# Patient Record
Sex: MALE | Race: WHITE | NOT HISPANIC OR LATINO | Employment: FULL TIME | URBAN - NONMETROPOLITAN AREA
[De-identification: names, ages, dates, MRNs, and addresses within clinical notes are randomized per-mention and may not be internally consistent; named-entity substitution may affect disease eponyms.]

---

## 2023-05-24 ENCOUNTER — OFFICE VISIT (OUTPATIENT)
Dept: PRIMARY CARE | Facility: CLINIC | Age: 23
End: 2023-05-24
Payer: COMMERCIAL

## 2023-05-24 VITALS
BODY MASS INDEX: 21.67 KG/M2 | OXYGEN SATURATION: 97 % | TEMPERATURE: 97.9 F | HEART RATE: 96 BPM | WEIGHT: 151 LBS | DIASTOLIC BLOOD PRESSURE: 70 MMHG | SYSTOLIC BLOOD PRESSURE: 108 MMHG

## 2023-05-24 DIAGNOSIS — K52.9 GASTROENTERITIS: Primary | ICD-10-CM

## 2023-05-24 PROCEDURE — 99214 OFFICE O/P EST MOD 30 MIN: CPT | Performed by: FAMILY MEDICINE

## 2023-05-24 PROCEDURE — 1036F TOBACCO NON-USER: CPT | Performed by: FAMILY MEDICINE

## 2023-05-24 RX ORDER — ONDANSETRON 4 MG/1
TABLET, FILM COATED ORAL
COMMUNITY
End: 2024-01-24 | Stop reason: WASHOUT

## 2023-05-24 RX ORDER — ACETAMINOPHEN 500 MG
TABLET ORAL EVERY 6 HOURS PRN
COMMUNITY

## 2023-05-24 RX ORDER — IBUPROFEN 200 MG
TABLET ORAL EVERY 6 HOURS PRN
COMMUNITY

## 2023-05-24 RX ORDER — HYDROGEN PEROXIDE 3 %
20 SOLUTION, NON-ORAL MISCELLANEOUS 2 TIMES DAILY
COMMUNITY
Start: 2023-03-22 | End: 2023-05-24 | Stop reason: SDUPTHER

## 2023-05-24 RX ORDER — HYDROGEN PEROXIDE 3 %
40 SOLUTION, NON-ORAL MISCELLANEOUS 2 TIMES DAILY
Qty: 90 CAPSULE | Refills: 3 | Status: SHIPPED | OUTPATIENT
Start: 2023-05-24 | End: 2023-05-26

## 2023-05-24 ASSESSMENT — ENCOUNTER SYMPTOMS
VOMITING: 0
SINUS PRESSURE: 0
MYALGIAS: 0
COUGH: 0
ABDOMINAL PAIN: 1
RHINORRHEA: 0
PALPITATIONS: 0
HEADACHES: 0
SHORTNESS OF BREATH: 0
DIZZINESS: 0
ACTIVITY CHANGE: 0
NAUSEA: 1
LIGHT-HEADEDNESS: 0
EYE ITCHING: 0
UNEXPECTED WEIGHT CHANGE: 1
DIARRHEA: 1
JOINT SWELLING: 0
DYSURIA: 0
BLOOD IN STOOL: 1
EYE DISCHARGE: 0
NERVOUS/ANXIOUS: 0
WEAKNESS: 0
WHEEZING: 0
CONSTIPATION: 1
SORE THROAT: 0
HEMATURIA: 0
ARTHRALGIAS: 0
FLANK PAIN: 0
FEVER: 0
SLEEP DISTURBANCE: 0
DYSPHORIC MOOD: 0
NUMBNESS: 0
APPETITE CHANGE: 0

## 2023-05-24 NOTE — PROGRESS NOTES
Subjective   Patient ID: Bruno Anders is a 22 y.o. male who presents for GI Problem (CRAMPING, PAIN, GASSY, UNCONTROLLABLE BOWELS-NORMAL TEXTURE).    GI Problem  The primary symptoms include abdominal pain, nausea and diarrhea. Primary symptoms do not include fever, vomiting, dysuria, myalgias, arthralgias or rash.   The illness is also significant for constipation.    HISTORY OF PUD CAN TELL WHEN OFF THE MEDICATION     Review of Systems   Constitutional:  Positive for unexpected weight change. Negative for activity change, appetite change and fever.   HENT:  Negative for congestion, ear pain, postnasal drip, rhinorrhea, sinus pressure and sore throat.    Eyes:  Negative for discharge, itching and visual disturbance.   Respiratory:  Negative for cough, shortness of breath and wheezing.    Cardiovascular:  Negative for chest pain, palpitations and leg swelling.   Gastrointestinal:  Positive for abdominal pain, blood in stool, constipation, diarrhea and nausea. Negative for vomiting.        WIPING  MAY HAVE BLOOD    Endocrine: Negative for cold intolerance, heat intolerance and polyuria.   Genitourinary:  Negative for dysuria, flank pain and hematuria.   Musculoskeletal:  Negative for arthralgias, gait problem, joint swelling and myalgias.   Skin:  Negative for rash.   Allergic/Immunologic: Negative for environmental allergies and food allergies.   Neurological:  Negative for dizziness, syncope, weakness, light-headedness, numbness and headaches.   Psychiatric/Behavioral:  Negative for dysphoric mood and sleep disturbance. The patient is not nervous/anxious.        Objective   /70   Pulse 96   Temp 36.6 °C (97.9 °F)   Wt 68.5 kg (151 lb)   SpO2 97%   BMI 21.67 kg/m²     Physical Exam  Vitals and nursing note reviewed.   Constitutional:       Appearance: Normal appearance.   HENT:      Head: Normocephalic.      Mouth/Throat:      Mouth: Mucous membranes are moist.   Cardiovascular:      Rate and Rhythm:  Normal rate and regular rhythm.      Pulses: Normal pulses.      Heart sounds: Normal heart sounds. No murmur heard.     No friction rub. No gallop.   Pulmonary:      Effort: Pulmonary effort is normal. No respiratory distress.      Breath sounds: Normal breath sounds. No wheezing.   Abdominal:      General: Abdomen is flat. Bowel sounds are normal. There is no distension.      Palpations: Abdomen is soft. There is no mass.      Tenderness: There is abdominal tenderness.      Comments: TENDER LUQ OF THE ABDOMEN  OLDER CT SHOWED SMALL BOWEL ISSUES    Musculoskeletal:         General: No deformity. Normal range of motion.   Skin:     General: Skin is warm and dry.      Capillary Refill: Capillary refill takes less than 2 seconds.   Neurological:      General: No focal deficit present.      Mental Status: He is alert and oriented to person, place, and time.   Psychiatric:         Mood and Affect: Mood normal.         Assessment/Plan   Diagnoses and all orders for this visit:  Gastroenteritis  -     Referral to Gastroenterology; Future  -     esomeprazole (NexIUM) 20 mg DR capsule; Take 2 capsules (40 mg) by mouth 2 times a day.

## 2023-05-26 ENCOUNTER — TELEPHONE (OUTPATIENT)
Dept: PRIMARY CARE | Facility: CLINIC | Age: 23
End: 2023-05-26
Payer: COMMERCIAL

## 2023-05-26 DIAGNOSIS — K52.9 GASTROENTERITIS: ICD-10-CM

## 2023-05-26 RX ORDER — ESOMEPRAZOLE MAGNESIUM 40 MG/1
40 CAPSULE, DELAYED RELEASE ORAL
Qty: 90 CAPSULE | Refills: 3 | Status: SHIPPED | OUTPATIENT
Start: 2023-05-26 | End: 2024-05-25

## 2023-05-26 NOTE — TELEPHONE ENCOUNTER
Bruno called and left a vm stating the nexium was supposed to be sent over as a 40 mg tablet once daily but was sent over as 20 mg two tablets twice daily- this will cost him 200.00 out of pocket

## 2023-11-10 ENCOUNTER — APPOINTMENT (OUTPATIENT)
Dept: RADIOLOGY | Facility: HOSPITAL | Age: 23
End: 2023-11-10
Payer: COMMERCIAL

## 2023-11-17 ENCOUNTER — HOSPITAL ENCOUNTER (OUTPATIENT)
Dept: RADIOLOGY | Facility: HOSPITAL | Age: 23
Discharge: HOME | End: 2023-11-17
Payer: COMMERCIAL

## 2023-11-17 DIAGNOSIS — S33.5XXA SPRAIN OF LIGAMENTS OF LUMBAR SPINE, INITIAL ENCOUNTER: ICD-10-CM

## 2023-11-17 PROCEDURE — 72148 MRI LUMBAR SPINE W/O DYE: CPT

## 2024-01-14 ENCOUNTER — APPOINTMENT (OUTPATIENT)
Dept: RADIOLOGY | Facility: HOSPITAL | Age: 24
End: 2024-01-14
Payer: COMMERCIAL

## 2024-01-14 ENCOUNTER — HOSPITAL ENCOUNTER (EMERGENCY)
Facility: HOSPITAL | Age: 24
Discharge: HOME | End: 2024-01-14
Attending: EMERGENCY MEDICINE
Payer: COMMERCIAL

## 2024-01-14 VITALS
DIASTOLIC BLOOD PRESSURE: 71 MMHG | RESPIRATION RATE: 18 BRPM | SYSTOLIC BLOOD PRESSURE: 127 MMHG | BODY MASS INDEX: 20.99 KG/M2 | HEART RATE: 82 BPM | WEIGHT: 155 LBS | OXYGEN SATURATION: 98 % | HEIGHT: 72 IN | TEMPERATURE: 97.3 F

## 2024-01-14 DIAGNOSIS — K29.00 OTHER ACUTE GASTRITIS, PRESENCE OF BLEEDING UNSPECIFIED: Primary | ICD-10-CM

## 2024-01-14 LAB
ALBUMIN SERPL BCP-MCNC: 4.5 G/DL (ref 3.4–5)
ALP SERPL-CCNC: 96 U/L (ref 33–120)
ALT SERPL W P-5'-P-CCNC: 15 U/L (ref 10–52)
ANION GAP SERPL CALC-SCNC: 11 MMOL/L (ref 10–20)
AST SERPL W P-5'-P-CCNC: 17 U/L (ref 9–39)
BASOPHILS # BLD AUTO: 0.06 X10*3/UL (ref 0–0.1)
BASOPHILS NFR BLD AUTO: 0.8 %
BILIRUB SERPL-MCNC: 0.5 MG/DL (ref 0–1.2)
BUN SERPL-MCNC: 18 MG/DL (ref 6–23)
CALCIUM SERPL-MCNC: 8.7 MG/DL (ref 8.6–10.3)
CHLORIDE SERPL-SCNC: 107 MMOL/L (ref 98–107)
CO2 SERPL-SCNC: 26 MMOL/L (ref 21–32)
CREAT SERPL-MCNC: 0.87 MG/DL (ref 0.5–1.3)
EGFRCR SERPLBLD CKD-EPI 2021: >90 ML/MIN/1.73M*2
EOSINOPHIL # BLD AUTO: 0.12 X10*3/UL (ref 0–0.7)
EOSINOPHIL NFR BLD AUTO: 1.6 %
ERYTHROCYTE [DISTWIDTH] IN BLOOD BY AUTOMATED COUNT: 11.6 % (ref 11.5–14.5)
GLUCOSE SERPL-MCNC: 90 MG/DL (ref 74–99)
HCT VFR BLD AUTO: 44.3 % (ref 41–52)
HGB BLD-MCNC: 15.5 G/DL (ref 13.5–17.5)
HOLD SPECIMEN: NORMAL
IMM GRANULOCYTES # BLD AUTO: 0.02 X10*3/UL (ref 0–0.7)
IMM GRANULOCYTES NFR BLD AUTO: 0.3 % (ref 0–0.9)
LACTATE SERPL-SCNC: 1.1 MMOL/L (ref 0.4–2)
LIPASE SERPL-CCNC: 44 U/L (ref 9–82)
LYMPHOCYTES # BLD AUTO: 2.52 X10*3/UL (ref 1.2–4.8)
LYMPHOCYTES NFR BLD AUTO: 33.9 %
MCH RBC QN AUTO: 30.5 PG (ref 26–34)
MCHC RBC AUTO-ENTMCNC: 35 G/DL (ref 32–36)
MCV RBC AUTO: 87 FL (ref 80–100)
MONOCYTES # BLD AUTO: 0.71 X10*3/UL (ref 0.1–1)
MONOCYTES NFR BLD AUTO: 9.5 %
NEUTROPHILS # BLD AUTO: 4.01 X10*3/UL (ref 1.2–7.7)
NEUTROPHILS NFR BLD AUTO: 53.9 %
NRBC BLD-RTO: 0 /100 WBCS (ref 0–0)
PLATELET # BLD AUTO: 203 X10*3/UL (ref 150–450)
POTASSIUM SERPL-SCNC: 4 MMOL/L (ref 3.5–5.3)
PROT SERPL-MCNC: 6.7 G/DL (ref 6.4–8.2)
RBC # BLD AUTO: 5.08 X10*6/UL (ref 4.5–5.9)
SODIUM SERPL-SCNC: 140 MMOL/L (ref 136–145)
WBC # BLD AUTO: 7.4 X10*3/UL (ref 4.4–11.3)

## 2024-01-14 PROCEDURE — 2500000004 HC RX 250 GENERAL PHARMACY W/ HCPCS (ALT 636 FOR OP/ED)

## 2024-01-14 PROCEDURE — 74177 CT ABD & PELVIS W/CONTRAST: CPT

## 2024-01-14 PROCEDURE — 2500000002 HC RX 250 W HCPCS SELF ADMINISTERED DRUGS (ALT 637 FOR MEDICARE OP, ALT 636 FOR OP/ED)

## 2024-01-14 PROCEDURE — 96361 HYDRATE IV INFUSION ADD-ON: CPT

## 2024-01-14 PROCEDURE — 96375 TX/PRO/DX INJ NEW DRUG ADDON: CPT

## 2024-01-14 PROCEDURE — 2500000001 HC RX 250 WO HCPCS SELF ADMINISTERED DRUGS (ALT 637 FOR MEDICARE OP): Performed by: EMERGENCY MEDICINE

## 2024-01-14 PROCEDURE — 83605 ASSAY OF LACTIC ACID: CPT | Performed by: EMERGENCY MEDICINE

## 2024-01-14 PROCEDURE — 36415 COLL VENOUS BLD VENIPUNCTURE: CPT | Performed by: EMERGENCY MEDICINE

## 2024-01-14 PROCEDURE — 83690 ASSAY OF LIPASE: CPT | Performed by: EMERGENCY MEDICINE

## 2024-01-14 PROCEDURE — 2550000001 HC RX 255 CONTRASTS: Performed by: EMERGENCY MEDICINE

## 2024-01-14 PROCEDURE — 99284 EMERGENCY DEPT VISIT MOD MDM: CPT | Mod: 25 | Performed by: EMERGENCY MEDICINE

## 2024-01-14 PROCEDURE — 74177 CT ABD & PELVIS W/CONTRAST: CPT | Performed by: STUDENT IN AN ORGANIZED HEALTH CARE EDUCATION/TRAINING PROGRAM

## 2024-01-14 PROCEDURE — 2500000004 HC RX 250 GENERAL PHARMACY W/ HCPCS (ALT 636 FOR OP/ED): Performed by: EMERGENCY MEDICINE

## 2024-01-14 PROCEDURE — 80053 COMPREHEN METABOLIC PANEL: CPT | Performed by: EMERGENCY MEDICINE

## 2024-01-14 PROCEDURE — 96374 THER/PROPH/DIAG INJ IV PUSH: CPT

## 2024-01-14 PROCEDURE — 85025 COMPLETE CBC W/AUTO DIFF WBC: CPT | Performed by: EMERGENCY MEDICINE

## 2024-01-14 RX ORDER — AMOXICILLIN 500 MG/1
500 CAPSULE ORAL EVERY 8 HOURS SCHEDULED
Qty: 30 CAPSULE | Refills: 0 | Status: SHIPPED | OUTPATIENT
Start: 2024-01-14 | End: 2024-01-24

## 2024-01-14 RX ORDER — CLARITHROMYCIN 500 MG/1
500 TABLET, FILM COATED ORAL 2 TIMES DAILY
Qty: 20 TABLET | Refills: 0 | Status: SHIPPED | OUTPATIENT
Start: 2024-01-14 | End: 2024-01-24 | Stop reason: WASHOUT

## 2024-01-14 RX ORDER — ONDANSETRON HYDROCHLORIDE 2 MG/ML
INJECTION, SOLUTION INTRAVENOUS
Status: COMPLETED
Start: 2024-01-14 | End: 2024-01-14

## 2024-01-14 RX ORDER — MORPHINE SULFATE 4 MG/ML
INJECTION INTRAVENOUS
Status: COMPLETED
Start: 2024-01-14 | End: 2024-01-14

## 2024-01-14 RX ORDER — AZITHROMYCIN 250 MG/1
500 TABLET, FILM COATED ORAL ONCE
Status: COMPLETED | OUTPATIENT
Start: 2024-01-14 | End: 2024-01-14

## 2024-01-14 RX ORDER — AZITHROMYCIN 250 MG/1
TABLET, FILM COATED ORAL
Status: COMPLETED
Start: 2024-01-14 | End: 2024-01-14

## 2024-01-14 RX ORDER — PHENOL/SODIUM PHENOLATE
20 AEROSOL, SPRAY (ML) MUCOUS MEMBRANE 2 TIMES DAILY
Qty: 20 TABLET | Refills: 0 | Status: SHIPPED | OUTPATIENT
Start: 2024-01-14 | End: 2024-01-24 | Stop reason: ALTCHOICE

## 2024-01-14 RX ORDER — ONDANSETRON HYDROCHLORIDE 2 MG/ML
4 INJECTION, SOLUTION INTRAVENOUS ONCE
Status: COMPLETED | OUTPATIENT
Start: 2024-01-14 | End: 2024-01-14

## 2024-01-14 RX ORDER — MORPHINE SULFATE 4 MG/ML
4 INJECTION INTRAVENOUS ONCE
Status: COMPLETED | OUTPATIENT
Start: 2024-01-14 | End: 2024-01-14

## 2024-01-14 RX ORDER — AMOXICILLIN 250 MG/1
500 CAPSULE ORAL EVERY 8 HOURS SCHEDULED
Status: DISCONTINUED | OUTPATIENT
Start: 2024-01-14 | End: 2024-01-14 | Stop reason: HOSPADM

## 2024-01-14 RX ORDER — CLARITHROMYCIN 250 MG/1
500 TABLET, FILM COATED ORAL EVERY 12 HOURS SCHEDULED
Status: DISCONTINUED | OUTPATIENT
Start: 2024-01-14 | End: 2024-01-14

## 2024-01-14 RX ADMIN — IOHEXOL 75 ML: 350 INJECTION, SOLUTION INTRAVENOUS at 08:59

## 2024-01-14 RX ADMIN — AMOXICILLIN 500 MG: 250 CAPSULE ORAL at 08:49

## 2024-01-14 RX ADMIN — SODIUM CHLORIDE 1000 ML: 9 INJECTION, SOLUTION INTRAVENOUS at 08:03

## 2024-01-14 RX ADMIN — AZITHROMYCIN DIHYDRATE 500 MG: 250 TABLET ORAL at 08:49

## 2024-01-14 RX ADMIN — ONDANSETRON 4 MG: 2 INJECTION INTRAMUSCULAR; INTRAVENOUS at 08:03

## 2024-01-14 RX ADMIN — MORPHINE SULFATE 4 MG: 4 INJECTION INTRAVENOUS at 08:03

## 2024-01-14 RX ADMIN — ONDANSETRON HYDROCHLORIDE 4 MG: 2 INJECTION, SOLUTION INTRAVENOUS at 08:03

## 2024-01-14 RX ADMIN — MORPHINE SULFATE 4 MG: 4 INJECTION, SOLUTION INTRAMUSCULAR; INTRAVENOUS at 08:03

## 2024-01-14 RX ADMIN — AZITHROMYCIN 500 MG: 250 TABLET, FILM COATED ORAL at 08:49

## 2024-01-14 ASSESSMENT — PAIN SCALES - GENERAL
PAINLEVEL_OUTOF10: 10 - WORST POSSIBLE PAIN
PAINLEVEL_OUTOF10: 3

## 2024-01-14 ASSESSMENT — COLUMBIA-SUICIDE SEVERITY RATING SCALE - C-SSRS
6. HAVE YOU EVER DONE ANYTHING, STARTED TO DO ANYTHING, OR PREPARED TO DO ANYTHING TO END YOUR LIFE?: NO
2. HAVE YOU ACTUALLY HAD ANY THOUGHTS OF KILLING YOURSELF?: NO
1. IN THE PAST MONTH, HAVE YOU WISHED YOU WERE DEAD OR WISHED YOU COULD GO TO SLEEP AND NOT WAKE UP?: NO

## 2024-01-14 ASSESSMENT — PAIN DESCRIPTION - LOCATION
LOCATION: ABDOMEN
LOCATION: FACE

## 2024-01-14 ASSESSMENT — PAIN DESCRIPTION - PROGRESSION: CLINICAL_PROGRESSION: GRADUALLY IMPROVING

## 2024-01-14 ASSESSMENT — PAIN DESCRIPTION - PAIN TYPE
TYPE: ACUTE PAIN
TYPE: ACUTE PAIN

## 2024-01-14 ASSESSMENT — PAIN DESCRIPTION - DESCRIPTORS
DESCRIPTORS: STABBING
DESCRIPTORS: STABBING

## 2024-01-14 ASSESSMENT — PAIN DESCRIPTION - ORIENTATION: ORIENTATION: MID

## 2024-01-14 ASSESSMENT — PAIN - FUNCTIONAL ASSESSMENT
PAIN_FUNCTIONAL_ASSESSMENT: 0-10
PAIN_FUNCTIONAL_ASSESSMENT: 0-10

## 2024-01-14 NOTE — ED TRIAGE NOTES
Pt to ED c/o abdominal pain for 3 days. Pt states it is epigastric/mid-abdomen rated 10/10 and described as stabbing. Pt states intermittent nausea and vomiting. Pt with no previous abdominal surgery, only takes omeprazole at home for GERD.

## 2024-01-14 NOTE — ED PROVIDER NOTES
Formerly [facility], later [facility]  Department of Emergency Medicine   ED  Provider Note  1/14/2024  7:39 AM  AC04/AC04              History of Present Illness:   Bruno Anders is a 23 y.o. male presenting to the ED for epigastric abdominal pain, beginning a few months ago.  The complaint has been persistent, moderate in severity, and worsened by nothing.  Remote history of H. pylori gastritis.  He was treated 4 years ago and has been feeling well until a few months ago. He has had increasing abdominal pain with nausea and occasional vomiting.  Reports 1 episode of hematemesis after drinking water earlier this week.  He denies melena or black stool.  He feels this pain is similar to his last episode of gastritis.  He denies any drug or alcohol abuse.  He has no history of IBS or Crohn's disease.      Review of Systems:   Pertinent positives and review of systems as noted above.  Remaining 10 review of systems is negative or noncontributory to today's episode of care.        --------------------------------------------- PAST HISTORY ---------------------------------------------  Past Medical History:  has a past medical history of Chlamydial infection, unspecified (01/07/2019), Contact with and (suspected) exposure to infections with a predominantly sexual mode of transmission (01/04/2019), Fever, unspecified (06/06/2018), Gastritis, unspecified, without bleeding (01/29/2018), Noninfective gastroenteritis and colitis, unspecified (06/04/2021), Other specified health status, Pain in right shoulder (05/24/2016), Personal history of other diseases of the respiratory system (11/18/2016), Personal history of other specified conditions (01/04/2019), Personal history of pneumonia (recurrent) (03/04/2020), Postnasal drip (11/18/2016), and Unspecified abdominal pain (06/04/2021).    Past Surgical History:  has a past surgical history that includes Knee arthroscopy w/ debridement (12/29/2014); Tonsillectomy (12/29/2014); and Other surgical history  (06/06/2018).    Social History:  reports that he has never smoked. He has never used smokeless tobacco. He reports current alcohol use of about 7.0 standard drinks of alcohol per week. He reports that he does not use drugs.    Family History: family history includes Montez's esophagus in his mother; Diverticulosis in his mother; Macular degeneration in his mother; No Known Problems in his father. Unless otherwise noted, family history is non contributory    The patient’s home medications have been reviewed.    Allergies: Pepto-bismol [bismuth subsalicylate]    -------------------------------------------------- RESULTS -------------------------------------------------  All laboratory and radiology results have been personally reviewed by myself   LABS:  Results for orders placed or performed during the hospital encounter of 01/14/24   Light Blue Top   Result Value Ref Range    Extra Tube Hold for add-ons.    PST Top   Result Value Ref Range    Extra Tube Hold for add-ons.    Lavender Top   Result Value Ref Range    Extra Tube Hold for add-ons.    SST TOP   Result Value Ref Range    Extra Tube Hold for add-ons.    Gray Top   Result Value Ref Range    Extra Tube Hold for add-ons.    CBC and Auto Differential   Result Value Ref Range    WBC 7.4 4.4 - 11.3 x10*3/uL    nRBC 0.0 0.0 - 0.0 /100 WBCs    RBC 5.08 4.50 - 5.90 x10*6/uL    Hemoglobin 15.5 13.5 - 17.5 g/dL    Hematocrit 44.3 41.0 - 52.0 %    MCV 87 80 - 100 fL    MCH 30.5 26.0 - 34.0 pg    MCHC 35.0 32.0 - 36.0 g/dL    RDW 11.6 11.5 - 14.5 %    Platelets 203 150 - 450 x10*3/uL    Neutrophils % 53.9 40.0 - 80.0 %    Immature Granulocytes %, Automated 0.3 0.0 - 0.9 %    Lymphocytes % 33.9 13.0 - 44.0 %    Monocytes % 9.5 2.0 - 10.0 %    Eosinophils % 1.6 0.0 - 6.0 %    Basophils % 0.8 0.0 - 2.0 %    Neutrophils Absolute 4.01 1.20 - 7.70 x10*3/uL    Immature Granulocytes Absolute, Automated 0.02 0.00 - 0.70 x10*3/uL    Lymphocytes Absolute 2.52 1.20 - 4.80  x10*3/uL    Monocytes Absolute 0.71 0.10 - 1.00 x10*3/uL    Eosinophils Absolute 0.12 0.00 - 0.70 x10*3/uL    Basophils Absolute 0.06 0.00 - 0.10 x10*3/uL   Comprehensive metabolic panel   Result Value Ref Range    Glucose 90 74 - 99 mg/dL    Sodium 140 136 - 145 mmol/L    Potassium 4.0 3.5 - 5.3 mmol/L    Chloride 107 98 - 107 mmol/L    Bicarbonate 26 21 - 32 mmol/L    Anion Gap 11 10 - 20 mmol/L    Urea Nitrogen 18 6 - 23 mg/dL    Creatinine 0.87 0.50 - 1.30 mg/dL    eGFR >90 >60 mL/min/1.73m*2    Calcium 8.7 8.6 - 10.3 mg/dL    Albumin 4.5 3.4 - 5.0 g/dL    Alkaline Phosphatase 96 33 - 120 U/L    Total Protein 6.7 6.4 - 8.2 g/dL    AST 17 9 - 39 U/L    Bilirubin, Total 0.5 0.0 - 1.2 mg/dL    ALT 15 10 - 52 U/L   Lipase   Result Value Ref Range    Lipase 44 9 - 82 U/L       RADIOLOGY:  Interpreted by Radiologist.  CT abdomen pelvis w IV contrast   Final Result   1. Hepatomegaly and mild-to-moderate diffuse periportal edema.   Findings are nonspecific and differential considerations include   increased fluid status versus viral hepatitis.             Signed by: Vinod Patel 1/14/2024 9:47 AM   Dictation workstation:   [id number]          No results found for this or any previous visit (from the past 4464 hour(s)).  ------------------------- NURSING NOTES AND VITALS REVIEWED ---------------------------   The nursing notes within the ED encounter and vital signs as below have been reviewed.   /74   Pulse 71   Temp 36.3 °C (97.3 °F) (Temporal)   Resp 16   Ht 1.829 m (6')   Wt 70.3 kg (155 lb)   SpO2 100%   BMI 21.02 kg/m²   Oxygen Saturation Interpretation: Normal    Alert male in no acute distress  Lung sounds are clear  Heart tones are normal and regular  Abdomen is soft with epigastric tenderness without guarding mass or rebound  No CVA tenderness noted  Skin without rash  No focal neurological deficit  ------------------------------ ED COURSE/MEDICAL DECISION  MAKING----------------------    Medical Decision Making:   Patient has history of H. pylori with similar symptoms in the past.  He was started on erythromycin amoxicillin and PPI here in the ED. patient CT scan shows no acute pathology.  Patient is feeling better with ED treatment.  He was discharged with prescriptions for Maalox, Protonix, Biaxin, and amoxicillin.  He is stable for outpatient management with GI referral.    Counseling:   The emergency provider has spoken with the patient and discussed today’s results, in addition to providing specific details for the plan of care and counseling regarding the diagnosis and prognosis.  Questions are answered at this time and they are agreeable with the plan.      --------------------------------- IMPRESSION AND DISPOSITION ---------------------------------    IMPRESSION  1. Other acute gastritis, presence of bleeding unspecified        DISPOSITION  Disposition: Discharge to home  Patient condition is fair       Robert Carlson MD  01/14/24 3106

## 2024-01-14 NOTE — DISCHARGE INSTRUCTIONS
Biaxin amoxicillin and Protonix as prescribed.    Maalox 15 mL before meals and at bedtime.        Follow-up with your primary care doctor for GI referral later this week.    Return for worsening symptoms or concerns.

## 2024-01-24 ENCOUNTER — OFFICE VISIT (OUTPATIENT)
Dept: PRIMARY CARE | Facility: CLINIC | Age: 24
End: 2024-01-24
Payer: COMMERCIAL

## 2024-01-24 VITALS
TEMPERATURE: 97.7 F | SYSTOLIC BLOOD PRESSURE: 114 MMHG | OXYGEN SATURATION: 99 % | DIASTOLIC BLOOD PRESSURE: 68 MMHG | HEART RATE: 101 BPM | WEIGHT: 152 LBS | BODY MASS INDEX: 20.61 KG/M2

## 2024-01-24 DIAGNOSIS — K21.9 GASTROESOPHAGEAL REFLUX DISEASE WITHOUT ESOPHAGITIS: ICD-10-CM

## 2024-01-24 DIAGNOSIS — R55 SYNCOPE, UNSPECIFIED SYNCOPE TYPE: ICD-10-CM

## 2024-01-24 DIAGNOSIS — K52.9 GASTROENTERITIS: Primary | ICD-10-CM

## 2024-01-24 PROCEDURE — 99214 OFFICE O/P EST MOD 30 MIN: CPT | Performed by: FAMILY MEDICINE

## 2024-01-24 PROCEDURE — 1036F TOBACCO NON-USER: CPT | Performed by: FAMILY MEDICINE

## 2024-01-24 ASSESSMENT — ENCOUNTER SYMPTOMS
ARTHRALGIAS: 0
ABDOMINAL DISTENTION: 1
DIZZINESS: 0
WEAKNESS: 0
ABDOMINAL PAIN: 1
ACTIVITY CHANGE: 0
SLEEP DISTURBANCE: 0
EYE DISCHARGE: 0
FLANK PAIN: 0
CONSTIPATION: 0
COUGH: 0
NAUSEA: 0
NERVOUS/ANXIOUS: 0
WHEEZING: 0
HEMATURIA: 0
DIARRHEA: 0
SHORTNESS OF BREATH: 0
MYALGIAS: 0
SINUS PRESSURE: 0
UNEXPECTED WEIGHT CHANGE: 0
EYE ITCHING: 0
SORE THROAT: 0
HEADACHES: 0
JOINT SWELLING: 0
DYSPHORIC MOOD: 0
NUMBNESS: 0
RHINORRHEA: 0
VOMITING: 0
BLOOD IN STOOL: 0
FEVER: 0
DYSURIA: 0
PALPITATIONS: 0
LIGHT-HEADEDNESS: 0
APPETITE CHANGE: 0

## 2024-01-24 NOTE — PROGRESS NOTES
Subjective   Patient ID: Bruno Anders is a 23 y.o. male who presents for GI Problem (Increased acid reflux- randomly vomiting, diarrhea- on Omeprazole. Never followed up with GI), Hospital Follow-up ([facility]- heart palpitations.  Low potassium), and sycnope (Monday at work- note to return.).    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   HENT:  Negative for congestion, ear pain, postnasal drip, rhinorrhea, sinus pressure and sore throat.    Eyes:  Negative for discharge, itching and visual disturbance.   Respiratory:  Negative for cough, shortness of breath and wheezing.    Cardiovascular:  Negative for chest pain, palpitations and leg swelling.   Gastrointestinal:  Positive for abdominal distention and abdominal pain. Negative for blood in stool, constipation, diarrhea, nausea and vomiting.   Endocrine: Negative for cold intolerance, heat intolerance and polyuria.   Genitourinary:  Negative for dysuria, flank pain and hematuria.   Musculoskeletal:  Negative for arthralgias, gait problem, joint swelling and myalgias.   Skin:  Negative for rash.   Allergic/Immunologic: Negative for environmental allergies and food allergies.   Neurological:  Positive for syncope. Negative for dizziness, weakness, light-headedness, numbness and headaches.   Psychiatric/Behavioral:  Negative for dysphoric mood and sleep disturbance. The patient is not nervous/anxious.        Objective   /68   Pulse 101   Temp 36.5 °C (97.7 °F)   Wt 68.9 kg (152 lb)   SpO2 99%   BMI 20.61 kg/m²     Physical Exam  Vitals and nursing note reviewed.   Constitutional:       Appearance: Normal appearance.   HENT:      Head: Normocephalic.       Mouth/Throat:      Mouth: Mucous membranes are moist.   Cardiovascular:      Rate and Rhythm: Normal rate and regular rhythm.      Pulses: Normal pulses.      Heart sounds: Normal heart sounds. No murmur heard.     No friction rub. No gallop.   Pulmonary:      Effort: Pulmonary effort is normal. No respiratory distress.      Breath sounds: Normal breath sounds. No wheezing.   Abdominal:      General: Bowel sounds are normal. There is no distension.      Palpations: Abdomen is soft.      Tenderness: There is abdominal tenderness.   Musculoskeletal:         General: No deformity. Normal range of motion.   Skin:     General: Skin is warm and dry.      Capillary Refill: Capillary refill takes less than 2 seconds.   Neurological:      General: No focal deficit present.      Mental Status: He is alert and oriented to person, place, and time.   Psychiatric:         Mood and Affect: Mood normal.         Assessment/Plan   Diagnoses and all orders for this visit:  Gastroenteritis  -     Referral to Gastroenterology; Future  Gastroesophageal reflux disease without esophagitis  -     Referral to Gastroenterology; Future  Syncope, unspecified syncope type  -     Holter or Event Cardiac Monitor; Future

## 2024-01-24 NOTE — LETTER
January 24, 2024     Patient: Bruno Anders   YOB: 2000   Date of Visit: 1/24/2024       To Whom It May Concern:    Bruno Anders was seen in my clinic on 1/24/2024 at 11:00 am. Please excuse Bruno for his absence from work on this day to make the appointment.  Please excuse him from work until 1/28/24.    If you have any questions or concerns, please don't hesitate to call.         Sincerely,         Arlyn Laurent,         CC: No Recipients

## 2024-02-26 ENCOUNTER — HOSPITAL ENCOUNTER (EMERGENCY)
Facility: HOSPITAL | Age: 24
Discharge: HOME | End: 2024-02-26
Attending: EMERGENCY MEDICINE
Payer: COMMERCIAL

## 2024-02-26 ENCOUNTER — APPOINTMENT (OUTPATIENT)
Dept: RADIOLOGY | Facility: HOSPITAL | Age: 24
End: 2024-02-26
Payer: COMMERCIAL

## 2024-02-26 VITALS
WEIGHT: 155 LBS | TEMPERATURE: 98.5 F | RESPIRATION RATE: 16 BRPM | HEART RATE: 71 BPM | HEIGHT: 72 IN | OXYGEN SATURATION: 99 % | SYSTOLIC BLOOD PRESSURE: 118 MMHG | BODY MASS INDEX: 20.99 KG/M2 | DIASTOLIC BLOOD PRESSURE: 65 MMHG

## 2024-02-26 DIAGNOSIS — R10.9 ABDOMINAL PAIN, UNSPECIFIED ABDOMINAL LOCATION: Primary | ICD-10-CM

## 2024-02-26 DIAGNOSIS — K52.9 ENTEROCOLITIS: ICD-10-CM

## 2024-02-26 LAB
ALBUMIN SERPL BCP-MCNC: 5.1 G/DL (ref 3.4–5)
ALP SERPL-CCNC: 84 U/L (ref 33–120)
ALT SERPL W P-5'-P-CCNC: 16 U/L (ref 10–52)
ANION GAP SERPL CALC-SCNC: 14 MMOL/L (ref 10–20)
APPEARANCE UR: CLEAR
AST SERPL W P-5'-P-CCNC: 19 U/L (ref 9–39)
BASOPHILS # BLD AUTO: 0.05 X10*3/UL (ref 0–0.1)
BASOPHILS NFR BLD AUTO: 0.3 %
BILIRUB SERPL-MCNC: 1 MG/DL (ref 0–1.2)
BILIRUB UR STRIP.AUTO-MCNC: NEGATIVE MG/DL
BUN SERPL-MCNC: 14 MG/DL (ref 6–23)
CALCIUM SERPL-MCNC: 9.1 MG/DL (ref 8.6–10.3)
CHLORIDE SERPL-SCNC: 103 MMOL/L (ref 98–107)
CO2 SERPL-SCNC: 27 MMOL/L (ref 21–32)
COLOR UR: YELLOW
CREAT SERPL-MCNC: 0.85 MG/DL (ref 0.5–1.3)
EGFRCR SERPLBLD CKD-EPI 2021: >90 ML/MIN/1.73M*2
EOSINOPHIL # BLD AUTO: 0.01 X10*3/UL (ref 0–0.7)
EOSINOPHIL NFR BLD AUTO: 0.1 %
ERYTHROCYTE [DISTWIDTH] IN BLOOD BY AUTOMATED COUNT: 12 % (ref 11.5–14.5)
FLUAV RNA RESP QL NAA+PROBE: NOT DETECTED
FLUBV RNA RESP QL NAA+PROBE: NOT DETECTED
GLUCOSE SERPL-MCNC: 91 MG/DL (ref 74–99)
GLUCOSE UR STRIP.AUTO-MCNC: NEGATIVE MG/DL
HCT VFR BLD AUTO: 48.2 % (ref 41–52)
HGB BLD-MCNC: 17.2 G/DL (ref 13.5–17.5)
IMM GRANULOCYTES # BLD AUTO: 0.05 X10*3/UL (ref 0–0.7)
IMM GRANULOCYTES NFR BLD AUTO: 0.3 % (ref 0–0.9)
KETONES UR STRIP.AUTO-MCNC: ABNORMAL MG/DL
LACTATE SERPL-SCNC: 1.1 MMOL/L (ref 0.4–2)
LEUKOCYTE ESTERASE UR QL STRIP.AUTO: NEGATIVE
LIPASE SERPL-CCNC: 27 U/L (ref 9–82)
LYMPHOCYTES # BLD AUTO: 0.76 X10*3/UL (ref 1.2–4.8)
LYMPHOCYTES NFR BLD AUTO: 4.8 %
MCH RBC QN AUTO: 30.9 PG (ref 26–34)
MCHC RBC AUTO-ENTMCNC: 35.7 G/DL (ref 32–36)
MCV RBC AUTO: 87 FL (ref 80–100)
MONOCYTES # BLD AUTO: 1.08 X10*3/UL (ref 0.1–1)
MONOCYTES NFR BLD AUTO: 6.8 %
NEUTROPHILS # BLD AUTO: 13.97 X10*3/UL (ref 1.2–7.7)
NEUTROPHILS NFR BLD AUTO: 87.7 %
NITRITE UR QL STRIP.AUTO: NEGATIVE
NRBC BLD-RTO: 0 /100 WBCS (ref 0–0)
PH UR STRIP.AUTO: 5 [PH]
PLATELET # BLD AUTO: 205 X10*3/UL (ref 150–450)
POTASSIUM SERPL-SCNC: 3.9 MMOL/L (ref 3.5–5.3)
PROT SERPL-MCNC: 7.6 G/DL (ref 6.4–8.2)
PROT UR STRIP.AUTO-MCNC: NEGATIVE MG/DL
RBC # BLD AUTO: 5.56 X10*6/UL (ref 4.5–5.9)
RBC # UR STRIP.AUTO: NEGATIVE /UL
SARS-COV-2 RNA RESP QL NAA+PROBE: NOT DETECTED
SODIUM SERPL-SCNC: 140 MMOL/L (ref 136–145)
SP GR UR STRIP.AUTO: 1.02
UROBILINOGEN UR STRIP.AUTO-MCNC: <2 MG/DL
WBC # BLD AUTO: 15.9 X10*3/UL (ref 4.4–11.3)

## 2024-02-26 PROCEDURE — 85025 COMPLETE CBC W/AUTO DIFF WBC: CPT | Performed by: EMERGENCY MEDICINE

## 2024-02-26 PROCEDURE — 83605 ASSAY OF LACTIC ACID: CPT | Performed by: EMERGENCY MEDICINE

## 2024-02-26 PROCEDURE — 36415 COLL VENOUS BLD VENIPUNCTURE: CPT | Performed by: EMERGENCY MEDICINE

## 2024-02-26 PROCEDURE — 80053 COMPREHEN METABOLIC PANEL: CPT | Performed by: EMERGENCY MEDICINE

## 2024-02-26 PROCEDURE — 83690 ASSAY OF LIPASE: CPT | Performed by: EMERGENCY MEDICINE

## 2024-02-26 PROCEDURE — 74176 CT ABD & PELVIS W/O CONTRAST: CPT | Performed by: RADIOLOGY

## 2024-02-26 PROCEDURE — 87506 IADNA-DNA/RNA PROBE TQ 6-11: CPT | Mod: CONLAB | Performed by: EMERGENCY MEDICINE

## 2024-02-26 PROCEDURE — 96361 HYDRATE IV INFUSION ADD-ON: CPT

## 2024-02-26 PROCEDURE — 2500000004 HC RX 250 GENERAL PHARMACY W/ HCPCS (ALT 636 FOR OP/ED): Performed by: EMERGENCY MEDICINE

## 2024-02-26 PROCEDURE — 99284 EMERGENCY DEPT VISIT MOD MDM: CPT | Mod: 25

## 2024-02-26 PROCEDURE — 87636 SARSCOV2 & INF A&B AMP PRB: CPT | Performed by: EMERGENCY MEDICINE

## 2024-02-26 PROCEDURE — 2500000001 HC RX 250 WO HCPCS SELF ADMINISTERED DRUGS (ALT 637 FOR MEDICARE OP): Performed by: EMERGENCY MEDICINE

## 2024-02-26 PROCEDURE — 74176 CT ABD & PELVIS W/O CONTRAST: CPT

## 2024-02-26 PROCEDURE — 96374 THER/PROPH/DIAG INJ IV PUSH: CPT

## 2024-02-26 PROCEDURE — 81003 URINALYSIS AUTO W/O SCOPE: CPT | Performed by: EMERGENCY MEDICINE

## 2024-02-26 PROCEDURE — 87086 URINE CULTURE/COLONY COUNT: CPT | Mod: CONLAB | Performed by: EMERGENCY MEDICINE

## 2024-02-26 RX ORDER — METRONIDAZOLE 500 MG/1
500 TABLET ORAL 3 TIMES DAILY
Qty: 30 TABLET | Refills: 0 | Status: SHIPPED | OUTPATIENT
Start: 2024-02-26 | End: 2024-03-07

## 2024-02-26 RX ORDER — DICYCLOMINE HYDROCHLORIDE 10 MG/1
10 CAPSULE ORAL ONCE
Status: COMPLETED | OUTPATIENT
Start: 2024-02-26 | End: 2024-02-26

## 2024-02-26 RX ORDER — ONDANSETRON HYDROCHLORIDE 2 MG/ML
4 INJECTION, SOLUTION INTRAVENOUS ONCE
Status: COMPLETED | OUTPATIENT
Start: 2024-02-26 | End: 2024-02-26

## 2024-02-26 RX ORDER — OMEPRAZOLE 40 MG/1
40 CAPSULE, DELAYED RELEASE ORAL
COMMUNITY
End: 2024-05-20 | Stop reason: WASHOUT

## 2024-02-26 RX ORDER — SODIUM CHLORIDE 9 MG/ML
125 INJECTION, SOLUTION INTRAVENOUS CONTINUOUS
Status: DISCONTINUED | OUTPATIENT
Start: 2024-02-26 | End: 2024-02-26 | Stop reason: HOSPADM

## 2024-02-26 RX ORDER — CIPROFLOXACIN 500 MG/1
500 TABLET ORAL 2 TIMES DAILY
Qty: 20 TABLET | Refills: 0 | Status: SHIPPED | OUTPATIENT
Start: 2024-02-26 | End: 2024-03-07

## 2024-02-26 RX ADMIN — SODIUM CHLORIDE 125 ML/HR: 9 INJECTION, SOLUTION INTRAVENOUS at 17:26

## 2024-02-26 RX ADMIN — ONDANSETRON 4 MG: 2 INJECTION INTRAMUSCULAR; INTRAVENOUS at 17:41

## 2024-02-26 RX ADMIN — DICYCLOMINE HYDROCHLORIDE 10 MG: 10 CAPSULE ORAL at 17:41

## 2024-02-26 ASSESSMENT — COLUMBIA-SUICIDE SEVERITY RATING SCALE - C-SSRS
1. IN THE PAST MONTH, HAVE YOU WISHED YOU WERE DEAD OR WISHED YOU COULD GO TO SLEEP AND NOT WAKE UP?: NO
2. HAVE YOU ACTUALLY HAD ANY THOUGHTS OF KILLING YOURSELF?: NO
6. HAVE YOU EVER DONE ANYTHING, STARTED TO DO ANYTHING, OR PREPARED TO DO ANYTHING TO END YOUR LIFE?: NO
6. HAVE YOU EVER DONE ANYTHING, STARTED TO DO ANYTHING, OR PREPARED TO DO ANYTHING TO END YOUR LIFE?: NO
2. HAVE YOU ACTUALLY HAD ANY THOUGHTS OF KILLING YOURSELF?: NO
1. IN THE PAST MONTH, HAVE YOU WISHED YOU WERE DEAD OR WISHED YOU COULD GO TO SLEEP AND NOT WAKE UP?: NO

## 2024-02-26 ASSESSMENT — PAIN - FUNCTIONAL ASSESSMENT: PAIN_FUNCTIONAL_ASSESSMENT: 0-10

## 2024-02-26 NOTE — ED PROVIDER NOTES
[facility]   ED  Provider Note  2/26/2024  4:29 PM  AC06/AC06        History of Present Illness:   Bruno Anders is a 23 y.o. male presenting to the ED for left lower abdominal pain, beginning many months ago.  The complaint has been persistent, worsening over time in severity, and worsened by nothing.  Patient has been having pain for many months.  He has appoint with a GI specialist for follow-up but could not make the appointment.  States over the past week his pain is worsened once again.  He has chronic diarrhea which is unchanged from baseline.  He denies melena or hematemesis.  He denies fever or chills.  He has no known COVID or influenza exposure.      Review of Systems:   Pertinent positives and review of systems as noted above.  Remaining 10 review of systems is negative or noncontributory to today's episode of care.  Review of Systems       --------------------------------------------- PAST HISTORY ---------------------------------------------  Past Medical History:  has a past medical history of Chlamydial infection, unspecified (01/07/2019), Contact with and (suspected) exposure to infections with a predominantly sexual mode of transmission (01/04/2019), Fever, unspecified (06/06/2018), Gastritis, unspecified, without bleeding (01/29/2018), Noninfective gastroenteritis and colitis, unspecified (06/04/2021), Other specified health status, Pain in right shoulder (05/24/2016), Personal history of other diseases of the respiratory system (11/18/2016), Personal history of other specified conditions (01/04/2019), Personal history of pneumonia (recurrent) (03/04/2020), Postnasal drip (11/18/2016), and Unspecified abdominal pain (06/04/2021).    Past Surgical History:  has a past surgical history that includes Knee arthroscopy w/ debridement (12/29/2014); Tonsillectomy (12/29/2014); and Other surgical history (06/06/2018).    Social History:  reports that he has never smoked. He has quit using smokeless  tobacco.  His smokeless tobacco use included chew. He reports that he does not currently use alcohol after a past usage of about 7.0 standard drinks of alcohol per week. He reports that he does not use drugs.    Family History: family history includes Montez's esophagus in his mother; Diverticulosis in his mother; Macular degeneration in his mother; No Known Problems in his father. Unless otherwise noted, family history is non contributory    Patient's Medications   New Prescriptions    No medications on file   Previous Medications    ACETAMINOPHEN (TYLENOL) 500 MG TABLET    Take by mouth every 6 hours if needed.    ASPIRIN-ACETAMINOPHEN-CAFFEINE (EXCEDRIN MIGRAINE) 250-250-65 MG TABLET    Take 1 tablet by mouth every 6 hours if needed for headaches.    ESOMEPRAZOLE (NEXIUM) 40 MG DR CAPSULE    Take 1 capsule (40 mg) by mouth once daily in the morning. Take before meals. Do not open capsule.    IBUPROFEN 200 MG TABLET    Take by mouth every 6 hours if needed.    OMEPRAZOLE (PRILOSEC) 40 MG DR CAPSULE    Take 1 capsule (40 mg) by mouth. Do not crush or chew.   Modified Medications    No medications on file   Discontinued Medications    No medications on file      The patient’s home medications have been reviewed.    Allergies: Pepto-bismol [bismuth subsalicylate]    -------------------------------------------------- RESULTS -------------------------------------------------  All laboratory and radiology results have been personally reviewed by myself   LABS:  Labs Reviewed   COMPREHENSIVE METABOLIC PANEL - Abnormal       Result Value    Glucose 91      Sodium 140      Potassium 3.9      Chloride 103      Bicarbonate 27      Anion Gap 14      Urea Nitrogen 14      Creatinine 0.85      eGFR >90      Calcium 9.1      Albumin 5.1 (*)     Alkaline Phosphatase 84      Total Protein 7.6      AST 19      Bilirubin, Total 1.0      ALT 16     CBC WITH AUTO DIFFERENTIAL - Abnormal    WBC 15.9 (*)     nRBC 0.0      RBC 5.56       Hemoglobin 17.2      Hematocrit 48.2      MCV 87      MCH 30.9      MCHC 35.7      RDW 12.0      Platelets 205      Neutrophils % 87.7      Immature Granulocytes %, Automated 0.3      Lymphocytes % 4.8      Monocytes % 6.8      Eosinophils % 0.1      Basophils % 0.3      Neutrophils Absolute 13.97 (*)     Immature Granulocytes Absolute, Automated 0.05      Lymphocytes Absolute 0.76 (*)     Monocytes Absolute 1.08 (*)     Eosinophils Absolute 0.01      Basophils Absolute 0.05     LACTATE - Normal    Lactate 1.1      Narrative:     Venipuncture immediately after or during the administration of Metamizole may lead to falsely low results. Testing should be performed immediately  prior to Metamizole dosing.   LIPASE - Normal    Lipase 27      Narrative:     Venipuncture immediately after or during the administration of Metamizole may lead to falsely low results. Testing should be performed immediately prior to Metamizole dosing.   URINE CULTURE   URINALYSIS WITH REFLEX CULTURE AND MICROSCOPIC    Narrative:     The following orders were created for panel order Urinalysis with Reflex Culture and Microscopic.  Procedure                               Abnormality         Status                     ---------                               -----------         ------                     Urinalysis with Reflex C...[[id number]]                                                 Extra Urine Gray Tube[[id number]]                                                         Please view results for these tests on the individual orders.   URINALYSIS WITH REFLEX CULTURE AND MICROSCOPIC   EXTRA URINE GRAY TUBE         RADIOLOGY:  Interpreted by Radiologist.  CT abdomen pelvis wo IV contrast   Final Result   No evidence for obstructive uropathy.  Bladder is partially distended   with apparent wall thickening. Correlate with symptomatology and   urinalysis if there is clinical concern for superimposed cystitis.        Nondilated fluid-filled loops of  small bowel with air-fluid levels   noted in the ascending colon. This is can be seen in the setting of   infectious/inflammatory enterocolitis. Correlate clinically.        Additional findings as described above.        MACRO:   None        Signed by: Mariah Frost 2/26/2024 5:36 PM   Dictation workstation:   [id number]          No results found for this or any previous visit (from the past 4464 hour(s)).  ------------------------- NURSING NOTES AND VITALS REVIEWED ---------------------------   The nursing notes within the ED encounter and vital signs as below have been reviewed.   /65   Pulse 71   Temp 36.9 °C (98.5 °F) (Tympanic)   Resp 18   Ht 1.829 m (6')   Wt 70.3 kg (155 lb)   SpO2 100%   BMI 21.02 kg/m²   Oxygen Saturation Interpretation: Normal      ---------------------------------------------------PHYSICAL EXAM--------------------------------------  Physical Exam   Constitutional/General: Alert and oriented x3, well appearing, non toxic in NAD  Head: Normocephalic and atraumatic  Eyes: PERRL, EOMI, conjunctiva normal, sclera non icteric  Mouth: Oropharynx clear, handling secretions, no trismus, no asymmetry of the posterior oropharynx or uvular edema  Neck: Supple, full ROM, non tender to palpation in the midline, no stridor, no crepitus, no meningeal signs  Respiratory: Lungs clear to auscultation bilaterally, no wheezes, rales, or rhonchi. Not in respiratory distress  Cardiovascular:  Regular rate. Regular rhythm. No murmurs, gallops, or rubs. 2+ distal pulses  Chest: No chest wall tenderness  GI:  Abdomen Soft, Non tender, Non distended.  +BS. No organomegaly, no palpable masses,  No rebound, guarding, or rigidity.   Musculoskeletal: Moves all extremities x 4. Warm and well perfused, no clubbing, cyanosis, or edema. Capillary refill <3 seconds  Integument: skin warm and dry. No rashes.   Lymphatic: no lymphadenopathy noted  Neurologic: No focal deficits, symmetric strength 5/5 in the  upper and lower extremities bilaterally  Psychiatric: Normal Affect    Procedures    ------------------------------ ED COURSE/MEDICAL DECISION MAKING----------------------  Diagnoses as of 02/26/24 1836   Abdominal pain, unspecified abdominal location   Enterocolitis      Has leukocytosis with a white count of 15.9.  CT scan demonstrates enterocolitis possible bacterial infection.  I have asked the patient give us a stool sample for help to select appropriate antibiotics for his treatment.  I feel the patient should be admitted but he prefers to be treated as an outpatient.  He understands that outpatient antibiotics may not be as effective as IV antibiotics.  I have asked him return here for any worsening symptoms or concerns.  I will request a close GI follow-up for further care and treatment.      Medical Decision Making:   Patient prefers outpatient care.  Although I would prefer the patient be admitted I will provide oral antibiotics and Bentyl.  Diagnoses as of 02/26/24 1836   Abdominal pain, unspecified abdominal location   Enterocolitis      Counseling:   The emergency provider has spoken with the patient and discussed today’s results, in addition to providing specific details for the plan of care and counseling regarding the diagnosis and prognosis.  Questions are answered at this time and they are agreeable with the plan.      --------------------------------- IMPRESSION AND DISPOSITION ---------------------------------        IMPRESSION  No diagnosis found.    DISPOSITION  Disposition: Discharge to home  Patient condition is fair      Billing Provider Critical Care Time: 0 minutes     Robert Carlson MD  02/29/24 0273

## 2024-02-26 NOTE — DISCHARGE INSTRUCTIONS
Flagyl and Cipro as prescribed.    The GI service should be calling you in the morning to arrange close outpatient follow-up.    Bentyl as prescribed.    Return for worsening symptoms or concerns.

## 2024-02-26 NOTE — ED TRIAGE NOTES
Pt arrived with left sided abd pain for last 2 years or more. Scheduled for a scope had ct about 1 month ago

## 2024-02-27 LAB
C COLI+JEJ+UPSA DNA STL QL NAA+PROBE: NOT DETECTED
EC STX1 GENE STL QL NAA+PROBE: NOT DETECTED
EC STX2 GENE STL QL NAA+PROBE: NOT DETECTED
HOLD SPECIMEN: NORMAL
NOROVIRUS GI + GII RNA STL NAA+PROBE: DETECTED
RV RNA STL NAA+PROBE: NOT DETECTED
SALMONELLA DNA STL QL NAA+PROBE: NOT DETECTED
SHIGELLA DNA SPEC QL NAA+PROBE: NOT DETECTED
V CHOLERAE DNA STL QL NAA+PROBE: NOT DETECTED
Y ENTEROCOL DNA STL QL NAA+PROBE: NOT DETECTED

## 2024-02-28 LAB — BACTERIA UR CULT: NORMAL

## 2024-02-29 ENCOUNTER — TELEPHONE (OUTPATIENT)
Dept: PHARMACY | Facility: HOSPITAL | Age: 24
End: 2024-02-29
Payer: COMMERCIAL

## 2024-02-29 NOTE — PROGRESS NOTES
"EDPD Note: Rapid Result Review    Reviewed Mr./Mrs./Ms. Carr States 's chart regarding a positive Norovirus culture/result that was taken during their recent emergency room visit. The patient was not told about these results prior to leaving the emergency department. Therefore, patient was contacted and given proper education. Patient states he is feeling as \"best as he can\". States he return to work as well. Advised patient to follow up with PCP if symptoms do not completely resolve.  Patient was told to continue Metronidazole and Cipro for enterocolitis.      Latest Reference Range & Units 02/26/24 18:37   Norovirus GI/GII Not Detected  Detected !   !: Data is abnormal      No further follow up needed from EDPD Team.     Patricia Bauer, PharmD      "

## 2024-05-08 ENCOUNTER — HOSPITAL ENCOUNTER (EMERGENCY)
Facility: HOSPITAL | Age: 24
Discharge: HOME | End: 2024-05-08
Attending: FAMILY MEDICINE
Payer: COMMERCIAL

## 2024-05-08 ENCOUNTER — APPOINTMENT (OUTPATIENT)
Dept: RADIOLOGY | Facility: HOSPITAL | Age: 24
End: 2024-05-08
Payer: COMMERCIAL

## 2024-05-08 VITALS
DIASTOLIC BLOOD PRESSURE: 78 MMHG | BODY MASS INDEX: 20.99 KG/M2 | SYSTOLIC BLOOD PRESSURE: 124 MMHG | OXYGEN SATURATION: 99 % | RESPIRATION RATE: 16 BRPM | HEIGHT: 72 IN | HEART RATE: 72 BPM | WEIGHT: 155 LBS

## 2024-05-08 DIAGNOSIS — S60.042A CONTUSION OF LEFT RING FINGER WITHOUT DAMAGE TO NAIL, INITIAL ENCOUNTER: Primary | ICD-10-CM

## 2024-05-08 DIAGNOSIS — S65.515A LACERATION OF BLOOD VESSEL OF LEFT RING FINGER, INITIAL ENCOUNTER: ICD-10-CM

## 2024-05-08 PROCEDURE — 2500000001 HC RX 250 WO HCPCS SELF ADMINISTERED DRUGS (ALT 637 FOR MEDICARE OP)

## 2024-05-08 PROCEDURE — 73140 X-RAY EXAM OF FINGER(S): CPT | Mod: LT

## 2024-05-08 PROCEDURE — 99283 EMERGENCY DEPT VISIT LOW MDM: CPT

## 2024-05-08 PROCEDURE — 73140 X-RAY EXAM OF FINGER(S): CPT | Mod: LEFT SIDE | Performed by: RADIOLOGY

## 2024-05-08 PROCEDURE — 12002 RPR S/N/AX/GEN/TRNK2.6-7.5CM: CPT

## 2024-05-08 RX ORDER — LIDOCAINE HYDROCHLORIDE 10 MG/ML
INJECTION INFILTRATION; PERINEURAL
Status: DISCONTINUED
Start: 2024-05-08 | End: 2024-05-08 | Stop reason: HOSPADM

## 2024-05-08 RX ORDER — IBUPROFEN 600 MG/1
TABLET ORAL
Status: COMPLETED
Start: 2024-05-08 | End: 2024-05-08

## 2024-05-08 RX ORDER — IBUPROFEN 600 MG/1
600 TABLET ORAL ONCE
Status: COMPLETED | OUTPATIENT
Start: 2024-05-08 | End: 2024-05-08

## 2024-05-08 RX ADMIN — IBUPROFEN 600 MG: 600 TABLET ORAL at 09:21

## 2024-05-08 ASSESSMENT — PAIN SCALES - GENERAL
PAINLEVEL_OUTOF10: 10 - WORST POSSIBLE PAIN
PAINLEVEL_OUTOF10: 0 - NO PAIN

## 2024-05-08 ASSESSMENT — COLUMBIA-SUICIDE SEVERITY RATING SCALE - C-SSRS
1. IN THE PAST MONTH, HAVE YOU WISHED YOU WERE DEAD OR WISHED YOU COULD GO TO SLEEP AND NOT WAKE UP?: NO
2. HAVE YOU ACTUALLY HAD ANY THOUGHTS OF KILLING YOURSELF?: NO
6. HAVE YOU EVER DONE ANYTHING, STARTED TO DO ANYTHING, OR PREPARED TO DO ANYTHING TO END YOUR LIFE?: NO

## 2024-05-08 ASSESSMENT — PAIN DESCRIPTION - LOCATION: LOCATION: FINGER (COMMENT WHICH ONE)

## 2024-05-08 ASSESSMENT — PAIN - FUNCTIONAL ASSESSMENT
PAIN_FUNCTIONAL_ASSESSMENT: 0-10
PAIN_FUNCTIONAL_ASSESSMENT: 0-10

## 2024-05-08 ASSESSMENT — PAIN DESCRIPTION - PAIN TYPE: TYPE: ACUTE PAIN

## 2024-05-08 ASSESSMENT — PAIN DESCRIPTION - ORIENTATION: ORIENTATION: LEFT

## 2024-05-08 NOTE — ED PROCEDURE NOTE
Procedure  Laceration Repair    Performed by: Shin Elena MD  Authorized by: Shin Elena MD    Consent:     Consent obtained:  Verbal    Consent given by:  Patient    Risks, benefits, and alternatives were discussed: yes      Risks discussed:  Infection, need for additional repair, nerve damage, pain, poor cosmetic result, poor wound healing, retained foreign body, tendon damage and vascular damage    Alternatives discussed:  No treatment, delayed treatment, observation and referral  Universal protocol:     Procedure explained and questions answered to patient or proxy's satisfaction: yes      Relevant documents present and verified: yes      Test results available: yes      Imaging studies available: yes      Required blood products, implants, devices, and special equipment available: yes      Site/side marked: yes      Immediately prior to procedure, a time out was called: yes      Patient identity confirmed:  Verbally with patient  Anesthesia:     Anesthesia method:  Local infiltration    Local anesthetic:  Lidocaine 1% w/o epi  Laceration details:     Location:  Hand    Hand location:  L hand, dorsum    Length (cm):  3  Pre-procedure details:     Preparation:  Patient was prepped and draped in usual sterile fashion and imaging obtained to evaluate for foreign bodies  Exploration:     Limited defect created (wound extended): no      Contaminated: no    Treatment:     Area cleansed with:  Saline, Shur-Clens and soap and water    Amount of cleaning:  Extensive    Irrigation solution:  Sterile saline    Irrigation volume:  50    Irrigation method:  Syringe    Visualized foreign bodies/material removed: no      Debridement:  None  Repair type:     Repair type: 3 and 3.  Comments:      Left ring finger wound was thoroughly prepped and draped and then subsequently anesthetized with 1% lidocaine without epinephrine.  Wound was thoroughly irrigated it was a superficial ragged edged laceration on the proximal part  of the finger on the volar surface on the PIP with thin flap distally post which will require wound irrigation and interrupted sutures total 3 interrupted sutures prior to each wound total and good approximation wound edges and excellent wound closure.  Patient tolerated procedure well.  Patient required total of 6 sutures.  Finger splint done and patient referred to hand specialist as well as primary care physician.  May return to work in 2 days.  His work requires climbing of the poles and with a finger injury should not be climbing poles.               Shin Elena MD  05/08/24 3494

## 2024-05-08 NOTE — ED PROVIDER NOTES
HPI   No chief complaint on file.      HPI  This 23 male patient came to the emergency with a complaint of injury to the left ring finger with laceration to the ring finger on the ring area when he tried to jump or chicken friends and his ring got caught and she complains causing forceful pull on the finger, as result he suffered a laceration he thinks could have injured his bone could not take the ring off and decided come to ER for evaluation he also had laceration around the ring area of the proximal phalanx.  Denies injury to other fingers he is able to move his fingers of.  Denies any numbness or tingling finger denies any elbow wrist or shoulder pain or injury.    Family history: Reviewed  Social history: Reviewed denies substance abuse.  Review of system: 10 review of system obtained review of system HPI otherwise negative.               No data recorded                   Patient History   Past Medical History:   Diagnosis Date    Chlamydial infection, unspecified 01/07/2019    Chlamydia infection    Contact with and (suspected) exposure to infections with a predominantly sexual mode of transmission 01/04/2019    Exposure to STD    Fever, unspecified 06/06/2018    Dehydration fever    Gastritis, unspecified, without bleeding 01/29/2018    Peptic gastritis    Noninfective gastroenteritis and colitis, unspecified 06/04/2021    Gastroenteritis, acute    Other specified health status     Known health problems: none    Pain in right shoulder 05/24/2016    Acute pain of right shoulder    Personal history of other diseases of the respiratory system 11/18/2016    History of sore throat    Personal history of other specified conditions 01/04/2019    History of dysuria    Personal history of pneumonia (recurrent) 03/04/2020    History of community acquired pneumonia    Postnasal drip 11/18/2016    Post-nasal drainage    Unspecified abdominal pain 06/04/2021    Abdominal cramping     Past Surgical History:   Procedure  Laterality Date    KNEE ARTHROSCOPY W/ DEBRIDEMENT  12/29/2014    Arthroscopy Knee    OTHER SURGICAL HISTORY  06/06/2018    Dental Surgery    TONSILLECTOMY  12/29/2014    Tonsillectomy     Family History   Problem Relation Name Age of Onset    Diverticulosis Mother      Macular degeneration Mother      Montez's esophagus Mother      No Known Problems Father       Social History     Tobacco Use    Smoking status: Never    Smokeless tobacco: Former     Types: Chew   Vaping Use    Vaping status: Every Day    Substances: Nicotine    Devices: Disposable, Pre-filled pod   Substance Use Topics    Alcohol use: Not Currently     Alcohol/week: 7.0 standard drinks of alcohol     Types: 7 Cans of beer per week    Drug use: Never       Physical Exam   ED Triage Vitals   Temp Pulse Resp BP   -- -- -- --      SpO2 Temp src Heart Rate Source Patient Position   -- -- -- --      BP Location FiO2 (%)     -- --       Physical Exam  Constitutional:       Appearance: Normal appearance.      Comments: Patient was awake and alert pleasant cooperative noted to have ring finger laceration on the ring finger still present and could not be removed, the ring needs to be cut down.  However he was able to flex and can all 40 including ring finger wrist and the thumb.  Back nontender.  No evidence of injury to head neck head was normocephalic atraumatic.  No facial bruise edema erythema or throat.  Throat was clear with intact.  Neck was supple.   HENT:      Head: Normocephalic and atraumatic.      Right Ear: External ear normal.      Left Ear: External ear normal.      Nose: Nose normal. No congestion or rhinorrhea.   Eyes:      Extraocular Movements: Extraocular movements intact.      Conjunctiva/sclera: Conjunctivae normal.      Pupils: Pupils are equal, round, and reactive to light.   Cardiovascular:      Rate and Rhythm: Normal rate and regular rhythm.      Pulses: Normal pulses.      Heart sounds: Normal heart sounds.   Pulmonary:       Effort: Pulmonary effort is normal.      Breath sounds: Normal breath sounds.   Abdominal:      General: Abdomen is flat. Bowel sounds are normal.      Palpations: Abdomen is soft.   Musculoskeletal:      Cervical back: Normal range of motion and neck supple.      Comments: Left ring finger irregular shaped laceration noted with some dried blood around the ring tender to palpation area of the ring but able to flex and extend all 5 digits including ring finger wrist and thumb  Proximal elbow and shoulder nontender bilaterally.  Right hand good range of motion without any evidence of injury.  Cervical thoracic lumbar spine nontender neck is supple.  Head is normocephalic atraumatic.  Chest wall nontender percussible patient walking close regular.  Lungs are clear no wheeze rales noted and nontender to palpation.   Skin:     General: Skin is warm.      Capillary Refill: Capillary refill takes less than 2 seconds.   Neurological:      General: No focal deficit present.      Mental Status: He is alert and oriented to person, place, and time.   Psychiatric:         Mood and Affect: Mood normal.         Behavior: Behavior normal.         ED Course & MDM   Diagnoses as of 05/08/24 1022   Contusion of left ring finger without damage to nail, initial encounter   Laceration of blood vessel of left ring finger, initial encounter   Left ring finger wound was thoroughly prepped and draped and then subsequently anesthetized with 1% lidocaine without epinephrine.  Wound was thoroughly irrigated it was a superficial ragged edged laceration on the proximal part of the finger on the volar surface on the PIP with thin flap distally post which will require wound irrigation and interrupted sutures total 3 interrupted sutures prior to each wound total and good approximation wound edges and excellent wound closure.  Patient tolerated procedure well.  Patient required total of 6 sutures.  Finger splint done and patient referred to hand  specialist as well as primary care physician.  May return to work in 2 days.  His work requires climbing of the poles and with a finger injury should not be climbing poles.    Medical Decision Making  Patient reported emergency room with complaint of breath left ring finger laceration from his own bring the card truck on the pole of the chicken wire and his his whole weight came down while his finger and hand was caught on the pole causing semicircular contusion and superficial laceration from his nose ring and pain in the finger.      Upon examination was noted regulated laceration p with some dried blood.  Ring was removed after significant effort by the staff and after he concern for laryngomalacia could not remove the ring.  X-ray of the left ring finger showed a tiny radiopaque foreign body and no fracture or dislocation.  Subsequently wound was thoroughly prepped and draped advised he was noted to have likely a superficial laceration proximal part of the wound with the PA at the volar surface on PIP joint and this thin skin flap distally.  Each wound required 3 sutures total 6 sutures applied after wound was thoroughly washed prepped and draped and irrigated.  Excellent wound closure approximation wound edges.    Patient had wound dressing done splint applied refer to hand specialist as well as primary care physician sutures out in 10 days more consistent with progressive decline falls at work.  If any problem concern return to ER     rocedure  Procedures     Shin Elena MD  05/08/24 0808       Shin Elena MD  05/08/24 0838       Shin Elena MD  05/08/24 1022       Shin Elena MD  05/08/24 1105     Estlander Flap (Upper To Lower Lip) Text: The defect of the lower lip was assessed and measured.  Given the location and size of the defect, an Estlander flap was deemed most appropriate.  Using a sterile surgical marker, an appropriate Estlander flap was measured and drawn on the upper lip. Local anesthesia was then infiltrated. A scalpel was then used to incise the lateral aspect of the flap, through skin, muscle and mucosa, leaving the flap pedicled medially.  The flap was then rotated and positioned to fill the lower lip defect.  The flap was then sutured into place with a three layer technique, closing the orbicularis oris muscle layer with subcutaneous buried sutures, followed by a mucosal layer and an epidermal layer.

## 2024-05-08 NOTE — DISCHARGE INSTRUCTIONS
Keep wound clean and dry apply bacitracin twice a day for next couple days then keep it to air dry keep the splint on for the next 3 days and then may remove the splint after 3 days and do the range of motion exercises fingers.  If any problem concern return to ER if any stiffness or worsening of symptoms follow-up with a hand specialist otherwise see primary care physician for follow-up as referred.  Tylenol Motrin for pain as needed.  Sutures out in 10 days

## 2024-05-10 ENCOUNTER — PATIENT OUTREACH (OUTPATIENT)
Dept: CARE COORDINATION | Facility: CLINIC | Age: 24
End: 2024-05-10
Payer: COMMERCIAL

## 2024-05-20 ENCOUNTER — OFFICE VISIT (OUTPATIENT)
Dept: PRIMARY CARE | Facility: CLINIC | Age: 24
End: 2024-05-20
Payer: COMMERCIAL

## 2024-05-20 ENCOUNTER — OFFICE VISIT (OUTPATIENT)
Dept: ORTHOPEDIC SURGERY | Facility: CLINIC | Age: 24
End: 2024-05-20
Payer: COMMERCIAL

## 2024-05-20 VITALS
SYSTOLIC BLOOD PRESSURE: 102 MMHG | WEIGHT: 156 LBS | TEMPERATURE: 98.5 F | DIASTOLIC BLOOD PRESSURE: 72 MMHG | OXYGEN SATURATION: 97 % | BODY MASS INDEX: 21.16 KG/M2 | HEART RATE: 109 BPM

## 2024-05-20 VITALS — HEIGHT: 72 IN | WEIGHT: 156 LBS | BODY MASS INDEX: 21.13 KG/M2

## 2024-05-20 DIAGNOSIS — T14.8XXA LACERATION INVOLVING TENDON: Primary | ICD-10-CM

## 2024-05-20 DIAGNOSIS — S65.515D LACERATION OF BLOOD VESSEL OF LEFT RING FINGER, SUBSEQUENT ENCOUNTER: ICD-10-CM

## 2024-05-20 DIAGNOSIS — S61.209A: Primary | ICD-10-CM

## 2024-05-20 PROCEDURE — 1036F TOBACCO NON-USER: CPT | Performed by: ORTHOPAEDIC SURGERY

## 2024-05-20 PROCEDURE — 99214 OFFICE O/P EST MOD 30 MIN: CPT | Performed by: FAMILY MEDICINE

## 2024-05-20 PROCEDURE — 99203 OFFICE O/P NEW LOW 30 MIN: CPT | Performed by: ORTHOPAEDIC SURGERY

## 2024-05-20 PROCEDURE — 1036F TOBACCO NON-USER: CPT | Performed by: FAMILY MEDICINE

## 2024-05-20 RX ORDER — RIFAXIMIN 550 MG/1
550 TABLET ORAL 3 TIMES DAILY
COMMUNITY
Start: 2024-04-26 | End: 2024-05-10

## 2024-05-20 ASSESSMENT — PAIN DESCRIPTION - DESCRIPTORS: DESCRIPTORS: ACHING;NUMBNESS;SORE

## 2024-05-20 ASSESSMENT — PAIN - FUNCTIONAL ASSESSMENT: PAIN_FUNCTIONAL_ASSESSMENT: 0-10

## 2024-05-20 ASSESSMENT — PAIN SCALES - GENERAL: PAINLEVEL_OUTOF10: 7

## 2024-05-20 NOTE — PROGRESS NOTES
[facility]  Hand and Upper Extremity Service  Initial evaluation / Consultation         Consult requested by Referring Physician: Dr. Laurent    Chief Complaint: Left ring finger injury          23 y.o right hand dominant male presenting for a left ring finger injury on 5/8/24. His ring was caught on a fence that he was trying to navigate, resulting in partial low grade ring avulsion type injury. He was seen in a local emergency department on the day of injury and the ring was removed. He sustained lacerations to the finger that were repaired. He didn't follow up with a hand surgeon but was seen by his PCP earlier today. He reports swelling, stiffness, numbness, and pain in his ring finger.          Please refer to New Patient Intake Form scanned into patient's electronic record for self reported past medical history, past surgical history, medications, allergies, family history, social history and 10 point review of systems    Examination:  Constitutional: Oriented to person, place, and time.  Appears well-developed and well-nourished.  Head: Normocephalic and atraumatic.  Eyes: Pupils are equal, round, and reactive to light.  Cardiovascular: Intact distal pulses.  Pulmonary/Chest/Breast: Effort normal. No respiratory distress.  Neurological: Alert and oriented to person, place, and time.  Skin: Skin is warm and dry.  Psychiatric: normal mood and affect.  Behavior is normal.  Musculoskeletal: Left hand reveals mild circumferential swelling of ring finger. Healing superficial wounds on the volar aspect of ring finger just distal to the MP joint flexion crease. Small healing wound on radial aspect of ring finger slightly distal to first. Able to demonstrate good MP and PIP joint range of motion. Demonstrates a trace amount of active DIP joint motion. Fingertip is well profuse but abnormal subjective sensation on radial and volar aspects of ring finger.        Personal  Interpretation of Diagnostic studies: Xrays of left hand taken in ER on 5/8/24 demonstrate no evidence of fracture.      Impression: Partial ring avulsion injury to left ring finger       Plan: I've given him a referral for edema control purposes, tendon gliding, and range of  motion. I believe there's a good chance his sensation will improve. I don't think there are any indications for operative exploration at this time. He's already back to work using his hand normally but I advised him that he may always have some swelling, stiffness, pain, and sensory changes as a result of this injury.       Follow up: 3-4 weeks               Fco Gibbs MD  [facility]  Department of Orthopaedic Surgery  Hand and Upper Extremity Reconstruction      Scribe Attestation  By signing my name below, I, Zeina Chery , Luisibsowmya   attest that this documentation has been prepared under the direction and in the presence of Dr. Fco Gibbs.      Dictation performed with the use of voice recognition software.  Syntax and grammatical errors may exist.

## 2024-05-20 NOTE — LETTER
May 20, 2024     Patient: Bruno Anders   YOB: 2000   Date of Visit: 5/20/2024       To Whom It May Concern:    Bruno Anders was seen in my clinic on 5/20/2024 at 9:15 am. Please excuse Bruno for his absence from work on this day to make the appointment.    If you have any questions or concerns, please don't hesitate to call.         Sincerely,         Arlyn Laurent,         CC: No Recipients

## 2024-05-20 NOTE — PROGRESS NOTES
Subjective   Patient ID: Bruno Anders is a 23 y.o. male who presents for Suture / Staple Removal (L ring finger. Jumped over a fence and his wedding band got stuck on the fence.  Having difficulty with  and also states he gets a shooting pain when the area next to the sutures is touched.).    HPI DR MARTINEZ REFERRED TO ORTHOPEDIC   PLACED SUTURES     Review of SystemsSINCE THE  VISIT NO INTERVAL HISTORICAL CHANGES IN ANY OF THE 12 SYSTEMS REVIEWED OTHER THAN THAN THIS LACERATION TO THE BASE OF THE 4 TH DIGIT LEFT HAND WHICH HAS HEALED WELL CONTINUES WITH PAIN IN THE PALM AND DIGIT    Objective   /72   Pulse 109   Temp 36.9 °C (98.5 °F)   Wt 70.8 kg (156 lb)   SpO2 97%   BMI 21.16 kg/m²     Physical ExamSINCE RECENT VISIT NO INTERVAL PHYSICAL CHANGES IN ANY OF THE 12 SYSTEMS REVIEWED OTHER THAN PAIN IN THE 4TH DIGIT LEFT HAND AND STITCHES (WELL HEALED )  REFERRAL TO HAND SPECIALIST WAS MADE , BUT PATIENT CLAIMS HE DID NOT KNOW AND A NEW ONE IS MADE TODAY   HAS NO STRENGTH TO EXTEND THE DIGIT     Assessment/Plan   Problem List Items Addressed This Visit             ICD-10-CM    Laceration of blood vessel of left ring finger S65.515A    Laceration involving tendon - Primary T14.8XXA    Relevant Orders    Referral to Orthopaedic Surgery

## 2024-06-24 ENCOUNTER — APPOINTMENT (OUTPATIENT)
Dept: ORTHOPEDIC SURGERY | Facility: CLINIC | Age: 24
End: 2024-06-24
Payer: COMMERCIAL

## 2024-06-25 ENCOUNTER — PATIENT OUTREACH (OUTPATIENT)
Dept: CARE COORDINATION | Facility: CLINIC | Age: 24
End: 2024-06-25
Payer: COMMERCIAL

## 2024-06-27 ENCOUNTER — APPOINTMENT (OUTPATIENT)
Dept: ORTHOPEDIC SURGERY | Facility: CLINIC | Age: 24
End: 2024-06-27
Payer: COMMERCIAL

## 2024-07-24 DIAGNOSIS — K52.9 GASTROENTERITIS: ICD-10-CM

## 2024-07-25 RX ORDER — ESOMEPRAZOLE MAGNESIUM 40 MG/1
40 CAPSULE, DELAYED RELEASE ORAL
Qty: 90 CAPSULE | Refills: 3 | Status: SHIPPED | OUTPATIENT
Start: 2024-07-25 | End: 2024-07-26 | Stop reason: SDUPTHER

## 2024-07-26 ENCOUNTER — OFFICE VISIT (OUTPATIENT)
Dept: PRIMARY CARE | Facility: CLINIC | Age: 24
End: 2024-07-26
Payer: COMMERCIAL

## 2024-07-26 VITALS
HEART RATE: 84 BPM | TEMPERATURE: 97.5 F | OXYGEN SATURATION: 98 % | WEIGHT: 153 LBS | SYSTOLIC BLOOD PRESSURE: 108 MMHG | DIASTOLIC BLOOD PRESSURE: 68 MMHG | BODY MASS INDEX: 20.75 KG/M2

## 2024-07-26 DIAGNOSIS — K52.9 GASTROENTERITIS: ICD-10-CM

## 2024-07-26 PROCEDURE — 99214 OFFICE O/P EST MOD 30 MIN: CPT | Performed by: FAMILY MEDICINE

## 2024-07-26 RX ORDER — ESOMEPRAZOLE MAGNESIUM 40 MG/1
40 CAPSULE, DELAYED RELEASE ORAL
Qty: 90 CAPSULE | Refills: 3 | Status: SHIPPED | OUTPATIENT
Start: 2024-07-26 | End: 2025-07-26

## 2024-07-26 ASSESSMENT — ENCOUNTER SYMPTOMS
ACTIVITY CHANGE: 0
LIGHT-HEADEDNESS: 0
PALPITATIONS: 0
JOINT SWELLING: 0
RHINORRHEA: 0
FLANK PAIN: 0
MYALGIAS: 0
VOMITING: 0
BLOOD IN STOOL: 0
SHORTNESS OF BREATH: 0
EYE ITCHING: 0
ABDOMINAL PAIN: 1
EYE DISCHARGE: 0
DYSURIA: 0
WEAKNESS: 0
CONSTIPATION: 0
SORE THROAT: 0
DIZZINESS: 0
NAUSEA: 0
APPETITE CHANGE: 0
HEMATURIA: 0
DYSPHORIC MOOD: 0
COUGH: 0
NERVOUS/ANXIOUS: 0
HEADACHES: 0
DIARRHEA: 1
ARTHRALGIAS: 0
FEVER: 0
SINUS PRESSURE: 0
UNEXPECTED WEIGHT CHANGE: 0
SLEEP DISTURBANCE: 0
WHEEZING: 0
NUMBNESS: 0

## 2024-07-26 NOTE — PROGRESS NOTES
Subjective   Patient ID: Bruno Anders is a 24 y.o. male who presents for Abdominal Pain (All symptoms for about 1 week), Constipation, and Diarrhea.    HPI OUT OF MEDICATION AND SYMPTOMS QUICKLY RETURN  HAS HAD EGD AND C SCOPE (NORMAL)    Review of Systems   Constitutional:  Negative for activity change, appetite change, fever and unexpected weight change.   HENT:  Negative for congestion, ear pain, postnasal drip, rhinorrhea, sinus pressure and sore throat.    Eyes:  Negative for discharge, itching and visual disturbance.   Respiratory:  Negative for cough, shortness of breath and wheezing.    Cardiovascular:  Negative for chest pain, palpitations and leg swelling.   Gastrointestinal:  Positive for abdominal pain and diarrhea. Negative for blood in stool, constipation, nausea and vomiting.   Endocrine: Negative for cold intolerance, heat intolerance and polyuria.   Genitourinary:  Negative for dysuria, flank pain and hematuria.   Musculoskeletal:  Negative for arthralgias, gait problem, joint swelling and myalgias.   Skin:  Negative for rash.   Allergic/Immunologic: Negative for environmental allergies and food allergies.   Neurological:  Negative for dizziness, syncope, weakness, light-headedness, numbness and headaches.   Psychiatric/Behavioral:  Negative for dysphoric mood and sleep disturbance. The patient is not nervous/anxious.        Objective   /68   Pulse 84   Temp 36.4 °C (97.5 °F)   Wt 69.4 kg (153 lb)   SpO2 98%   BMI 20.75 kg/m²     Physical Exam  Vitals and nursing note reviewed.   Constitutional:       Appearance: Normal appearance.   HENT:      Head: Normocephalic.   Cardiovascular:      Rate and Rhythm: Normal rate and regular rhythm.      Pulses: Normal pulses.      Heart sounds: Normal heart sounds. No murmur heard.     No friction rub. No gallop.   Pulmonary:      Effort: Pulmonary effort is normal. No respiratory distress.      Breath sounds: Normal breath sounds. No wheezing.    Abdominal:      General: Bowel sounds are normal. There is no distension.      Palpations: Abdomen is soft.      Tenderness: There is no abdominal tenderness.   Musculoskeletal:         General: No deformity. Normal range of motion.   Skin:     General: Skin is warm and dry.      Capillary Refill: Capillary refill takes less than 2 seconds.   Neurological:      General: No focal deficit present.      Mental Status: He is alert and oriented to person, place, and time.   Psychiatric:         Mood and Affect: Mood normal.         Assessment/Plan   Problem List Items Addressed This Visit             ICD-10-CM    Gastroenteritis K52.9    Relevant Medications    esomeprazole (NexIUM) 40 mg DR capsule

## 2024-07-30 ENCOUNTER — TELEPHONE (OUTPATIENT)
Dept: PRIMARY CARE | Facility: CLINIC | Age: 24
End: 2024-07-30
Payer: COMMERCIAL

## 2024-07-30 NOTE — TELEPHONE ENCOUNTER
Left VM requesting Bruno to call the office.  I Have faxed over the leave and disability paperwork to the number provided on form.  I have scanned this record into his chart as well

## 2024-07-30 NOTE — TELEPHONE ENCOUNTER
Bruno had called back-  the Paperwork for his employee  is not completed .  There is two sections that are needed to be completed

## 2024-09-01 ENCOUNTER — HOSPITAL ENCOUNTER (EMERGENCY)
Facility: HOSPITAL | Age: 24
Discharge: HOME | End: 2024-09-01
Attending: EMERGENCY MEDICINE
Payer: COMMERCIAL

## 2024-09-01 ENCOUNTER — APPOINTMENT (OUTPATIENT)
Dept: RADIOLOGY | Facility: HOSPITAL | Age: 24
End: 2024-09-01
Payer: COMMERCIAL

## 2024-09-01 VITALS
WEIGHT: 166.23 LBS | RESPIRATION RATE: 16 BRPM | TEMPERATURE: 97.6 F | HEIGHT: 72 IN | OXYGEN SATURATION: 100 % | BODY MASS INDEX: 22.51 KG/M2 | DIASTOLIC BLOOD PRESSURE: 70 MMHG | SYSTOLIC BLOOD PRESSURE: 115 MMHG | HEART RATE: 57 BPM

## 2024-09-01 DIAGNOSIS — S06.0X1A CONCUSSION WITH LOSS OF CONSCIOUSNESS OF 30 MINUTES OR LESS, INITIAL ENCOUNTER: ICD-10-CM

## 2024-09-01 DIAGNOSIS — S09.90XA CLOSED HEAD INJURY, INITIAL ENCOUNTER: Primary | ICD-10-CM

## 2024-09-01 PROCEDURE — 70450 CT HEAD/BRAIN W/O DYE: CPT

## 2024-09-01 PROCEDURE — 99284 EMERGENCY DEPT VISIT MOD MDM: CPT

## 2024-09-01 PROCEDURE — 70450 CT HEAD/BRAIN W/O DYE: CPT | Performed by: RADIOLOGY

## 2024-09-01 PROCEDURE — 2500000001 HC RX 250 WO HCPCS SELF ADMINISTERED DRUGS (ALT 637 FOR MEDICARE OP)

## 2024-09-01 PROCEDURE — 2500000005 HC RX 250 GENERAL PHARMACY W/O HCPCS

## 2024-09-01 RX ORDER — ONDANSETRON 4 MG/1
4 TABLET, ORALLY DISINTEGRATING ORAL ONCE
Status: COMPLETED | OUTPATIENT
Start: 2024-09-01 | End: 2024-09-01

## 2024-09-01 RX ORDER — ONDANSETRON 4 MG/1
4 TABLET, ORALLY DISINTEGRATING ORAL EVERY 8 HOURS PRN
Qty: 15 TABLET | Refills: 0 | Status: SHIPPED | OUTPATIENT
Start: 2024-09-01

## 2024-09-01 RX ORDER — TRAMADOL HYDROCHLORIDE 50 MG/1
50 TABLET ORAL EVERY 6 HOURS PRN
Qty: 12 TABLET | Refills: 0 | Status: SHIPPED | OUTPATIENT
Start: 2024-09-01 | End: 2024-09-06 | Stop reason: WASHOUT

## 2024-09-01 RX ORDER — TRAMADOL HYDROCHLORIDE 50 MG/1
TABLET ORAL
Status: COMPLETED
Start: 2024-09-01 | End: 2024-09-01

## 2024-09-01 RX ORDER — TRAMADOL HYDROCHLORIDE 50 MG/1
50 TABLET ORAL EVERY 6 HOURS PRN
Status: DISCONTINUED | OUTPATIENT
Start: 2024-09-01 | End: 2024-09-01 | Stop reason: HOSPADM

## 2024-09-01 RX ORDER — ONDANSETRON 4 MG/1
TABLET, ORALLY DISINTEGRATING ORAL
Status: COMPLETED
Start: 2024-09-01 | End: 2024-09-01

## 2024-09-01 ASSESSMENT — COLUMBIA-SUICIDE SEVERITY RATING SCALE - C-SSRS
1. IN THE PAST MONTH, HAVE YOU WISHED YOU WERE DEAD OR WISHED YOU COULD GO TO SLEEP AND NOT WAKE UP?: NO
6. HAVE YOU EVER DONE ANYTHING, STARTED TO DO ANYTHING, OR PREPARED TO DO ANYTHING TO END YOUR LIFE?: NO
2. HAVE YOU ACTUALLY HAD ANY THOUGHTS OF KILLING YOURSELF?: NO

## 2024-09-01 ASSESSMENT — PAIN DESCRIPTION - ORIENTATION: ORIENTATION: RIGHT

## 2024-09-01 ASSESSMENT — PAIN DESCRIPTION - LOCATION: LOCATION: HEAD

## 2024-09-01 ASSESSMENT — PAIN DESCRIPTION - DESCRIPTORS: DESCRIPTORS: ACHING

## 2024-09-01 ASSESSMENT — PAIN SCALES - GENERAL
PAINLEVEL_OUTOF10: 0 - NO PAIN
PAINLEVEL_OUTOF10: 8
PAINLEVEL_OUTOF10: 8
PAINLEVEL_OUTOF10: 4

## 2024-09-01 ASSESSMENT — PAIN DESCRIPTION - PAIN TYPE: TYPE: ACUTE PAIN

## 2024-09-01 ASSESSMENT — PAIN - FUNCTIONAL ASSESSMENT
PAIN_FUNCTIONAL_ASSESSMENT: 0-10
PAIN_FUNCTIONAL_ASSESSMENT: 0-10

## 2024-09-01 NOTE — DISCHARGE INSTRUCTIONS
Tramadol 3 times a day with food as needed for headache.    Zofran ODT as prescribed for nausea.    Follow-up with your primary care doctor in 3 to 5 days if not better.    Return to work on Friday.

## 2024-09-01 NOTE — ED PROVIDER NOTES
[facility]   ED  Provider Note  9/1/2024  1:04 PM  AC04/AC04      Chief Complaint   Patient presents with   • Head Injury     Hit in head with tree branch yesterday        History of Present Illness:   Bruno Anders is a 24 y.o. male presenting to the ED for Closed head injury, beginning Last night.  The complaint has been persistent,  in severity, and worsened by bright lights and loud noises.  Patient was breaking up a large limb for a bonfire last night.  He struck the limb against the ground to snap at length and a piece flew up and struck him in the right temple region.  He complains of pain to superior and posterior to the top of the ear.  He did not pass out at the time.  But 2 hours later he was unconscious for about 30 seconds.  Patient has persistent photophobia phonophobia and nausea.  He feels slightly off balance and has to concentrate to walk.  He describes a persistent throbbing headache throughout his entire skull.   He had no relief with Tylenol at home.      Review of Systems:   Pertinent positives and review of systems as noted above.  Remaining 10 review of systems is negative or noncontributory to today's episode of care.  Review of Systems       --------------------------------------------- PAST HISTORY ---------------------------------------------  Past Medical History:   Past Medical History:   Diagnosis Date   • Chlamydial infection, unspecified 01/07/2019    Chlamydia infection   • Contact with and (suspected) exposure to infections with a predominantly sexual mode of transmission 01/04/2019    Exposure to STD   • Fever, unspecified 06/06/2018    Dehydration fever   • Gastritis, unspecified, without bleeding 01/29/2018    Peptic gastritis   • Noninfective gastroenteritis and colitis, unspecified 06/04/2021    Gastroenteritis, acute   • Other specified health status     Known health problems: none   • Pain in right shoulder 05/24/2016    Acute pain of right shoulder   • Personal history of  other diseases of the respiratory system 11/18/2016    History of sore throat   • Personal history of other specified conditions 01/04/2019    History of dysuria   • Personal history of pneumonia (recurrent) 03/04/2020    History of community acquired pneumonia   • Postnasal drip 11/18/2016    Post-nasal drainage   • Unspecified abdominal pain 06/04/2021    Abdominal cramping        Past Surgical History:   Past Surgical History:   Procedure Laterality Date   • KNEE ARTHROSCOPY W/ DEBRIDEMENT  12/29/2014    Arthroscopy Knee   • OTHER SURGICAL HISTORY  06/06/2018    Dental Surgery   • TONSILLECTOMY  12/29/2014    Tonsillectomy        Social History:   Social History     Social History Narrative   • Not on file        Family History: family history includes Montez's esophagus in his mother; Diverticulosis in his mother; Macular degeneration in his mother; No Known Problems in his father. Unless otherwise noted, family history is non contributory    Patient's Medications   New Prescriptions    No medications on file   Previous Medications    ACETAMINOPHEN (TYLENOL) 500 MG TABLET    Take by mouth every 6 hours if needed.    ASPIRIN-ACETAMINOPHEN-CAFFEINE (EXCEDRIN MIGRAINE) 250-250-65 MG TABLET    Take 1 tablet by mouth every 6 hours if needed for headaches.    ESOMEPRAZOLE (NEXIUM) 40 MG DR CAPSULE    Take 1 capsule (40 mg) by mouth once daily in the morning. Take before meals. Do not open capsule.    IBUPROFEN 200 MG TABLET    Take by mouth every 6 hours if needed.   Modified Medications    No medications on file   Discontinued Medications    No medications on file      The patient’s home medications have been reviewed.    Allergies: Pepto-bismol [bismuth subsalicylate]    -------------------------------------------------- RESULTS -------------------------------------------------  All laboratory and radiology results have been personally reviewed by myself   LABS:  Labs Reviewed - No data to  display      RADIOLOGY:  Interpreted by Radiologist.  CT head wo IV contrast   Final Result   No acute intracranial pathologic findings are identified.             MACRO:   none        Signed by: Emanuel Perez 9/1/2024 1:36 PM   Dictation workstation:   [id number]          No results found for this or any previous visit (from the past 4464 hour(s)).  ------------------------- NURSING NOTES AND VITALS REVIEWED ---------------------------   The nursing notes within the ED encounter and vital signs as below have been reviewed.   /66   Pulse 57   Temp 36.4 °C (97.6 °F)   Resp 16   Ht 1.829 m (6')   Wt 75.4 kg (166 lb 3.6 oz)   SpO2 98%   BMI 22.54 kg/m²   Oxygen Saturation Interpretation: Normal      ---------------------------------------------------PHYSICAL EXAM--------------------------------------  Physical Exam   Constitutional/General: Alert,  well appearing, non toxic in NAD  Head: Normocephalic and There is right scalp tenderness just superior and posterior to the ear.  There is no break in skin integrity.  The right tympanic membrane is normal.    Eyes: PERRL, EOMI, conjunctiva normal, sclera non icteric  Mouth: Oropharynx clear, handling secretions, no trismus, no asymmetry of the posterior oropharynx or uvular edema  Neck: Supple, full ROM, non tender to palpation in the midline, no stridor, no crepitus, no meningeal signs  Respiratory: Lungs clear to auscultation bilaterally, no wheezes, rales, or rhonchi. Not in respiratory distress  Cardiovascular:  Regular rate. Regular rhythm. No murmurs, gallops, or rubs. 2+ distal pulses  Chest: No chest wall tenderness  GI:  Abdomen Soft, Non tender, Non distended.  +BS. No organomegaly, no palpable masses,  No rebound, guarding, or rigidity.   Musculoskeletal: Moves all extremities x 4. Warm and well perfused, no clubbing, cyanosis, or edema. Capillary refill <3 seconds  Integument: skin warm and dry. No rashes.   Lymphatic: no lymphadenopathy  noted  Neurologic: No focal deficits, symmetric strength 5/5 in the upper and lower extremities bilaterally  Psychiatric: Normal Affect    Procedures    ------------------------------ ED COURSE/MEDICAL DECISION MAKING----------------------  Diagnoses as of 09/01/24 1347   Closed head injury, initial encounter   Concussion with loss of consciousness of 30 minutes or less, initial encounter         Medical Decision Making:   Patient suffered a closed head injury last night.  He has ongoing tinnitus headache and photophobia.  He had a brief loss of conscious last night 2 hours after the event.  Patient feels lightheadedness to concentrate just to walk.  He will be discharged to home with tramadol for pain advised to rest the next 4 to 5 days and return to work on Friday if he feels better.  Diagnoses as of 09/01/24 1347   Closed head injury, initial encounter   Concussion with loss of consciousness of 30 minutes or less, initial encounter      Counseling:   The emergency provider has spoken with the patient and discussed today’s results, in addition to providing specific details for the plan of care and counseling regarding the diagnosis and prognosis.  Questions are answered at this time and they are agreeable with the plan.      --------------------------------- IMPRESSION AND DISPOSITION ---------------------------------        IMPRESSION  1. Closed head injury, initial encounter    2. Concussion with loss of consciousness of 30 minutes or less, initial encounter        DISPOSITION  Disposition: Discharge to home  Patient condition is poor      Billing Provider Critical Care Time: 0 minutes     Robert Carlson MD  09/03/24 0909

## 2024-09-01 NOTE — Clinical Note
Bruno Anders was seen and treated in our emergency department on 9/1/2024.  He may return to work on 09/06/2024.  Closed head injury with significant concussion     If you have any questions or concerns, please don't hesitate to call.      Robert Carlson MD

## 2024-09-06 ENCOUNTER — OFFICE VISIT (OUTPATIENT)
Dept: PRIMARY CARE | Facility: CLINIC | Age: 24
End: 2024-09-06
Payer: COMMERCIAL

## 2024-09-06 VITALS
SYSTOLIC BLOOD PRESSURE: 104 MMHG | HEART RATE: 80 BPM | BODY MASS INDEX: 21.97 KG/M2 | TEMPERATURE: 97.6 F | DIASTOLIC BLOOD PRESSURE: 68 MMHG | OXYGEN SATURATION: 98 % | WEIGHT: 162 LBS

## 2024-09-06 DIAGNOSIS — S09.90XD TRAUMATIC INJURY OF HEAD, SUBSEQUENT ENCOUNTER: Primary | ICD-10-CM

## 2024-09-06 DIAGNOSIS — S06.0X1D CONCUSSION WITH LOSS OF CONSCIOUSNESS OF 30 MINUTES OR LESS, SUBSEQUENT ENCOUNTER: ICD-10-CM

## 2024-09-06 PROBLEM — S09.90XA HEAD TRAUMA: Status: ACTIVE | Noted: 2024-09-06

## 2024-09-06 PROCEDURE — 99214 OFFICE O/P EST MOD 30 MIN: CPT | Performed by: FAMILY MEDICINE

## 2024-09-06 ASSESSMENT — ENCOUNTER SYMPTOMS
SLEEP DISTURBANCE: 0
MYALGIAS: 0
PALPITATIONS: 0
SORE THROAT: 0
NERVOUS/ANXIOUS: 0
HEMATURIA: 0
WEAKNESS: 0
ABDOMINAL PAIN: 0
WHEEZING: 0
CONSTIPATION: 0
HEADACHES: 1
NAUSEA: 0
COUGH: 0
JOINT SWELLING: 0
DYSPHORIC MOOD: 0
BLOOD IN STOOL: 0
NUMBNESS: 0
EYE DISCHARGE: 0
FEVER: 0
UNEXPECTED WEIGHT CHANGE: 0
RHINORRHEA: 0
EYE ITCHING: 0
APPETITE CHANGE: 0
SHORTNESS OF BREATH: 0
DIARRHEA: 0
DIZZINESS: 1
DYSURIA: 0
SINUS PRESSURE: 0
LIGHT-HEADEDNESS: 0
ACTIVITY CHANGE: 0
FLANK PAIN: 0
VOMITING: 0
ARTHRALGIAS: 0

## 2024-09-06 NOTE — PROGRESS NOTES
Subjective   Patient ID: Bruno Anders is a 24 y.o. male who presents for ER Follow-up (WAS IN ER ON 9/1/24 AND HAD A CONCUSSION AFTER A TREE BRANCH FELL ON HIM.//HE WOULD LIKE TO HAVE FMLA FORMS COMPLETED. ONCE HE PRINTS THEM OFF HE WILL DROP THEM OFF. HE WAS OFF WORK 9/1-9/5.) and Headache (CONTINUES TO STILL HAVE HEADACHES. WAS PRESCRIBED TRAMADOL BUT HAS STOPPED TAKING IT BECAUSE HE FELT ITCHY AFTER TAKING. TAKING TYLENOL FOR HEADACHES).    ER Follow-up  Associated symptoms include headaches. Pertinent negatives include no abdominal pain, arthralgias, chest pain, congestion, coughing, fever, joint swelling, myalgias, nausea, numbness, rash, sore throat, vomiting or weakness.   Headache   Associated symptoms include dizziness. Pertinent negatives include no abdominal pain, coughing, ear pain, fever, nausea, numbness, rhinorrhea, sinus pressure, sore throat, vomiting or weakness.    DID HAVE ONE EPISODE OF LOC     Review of Systems   Constitutional:  Negative for activity change, appetite change, fever and unexpected weight change.   HENT:  Negative for congestion, ear pain, postnasal drip, rhinorrhea, sinus pressure and sore throat.    Eyes:  Negative for discharge, itching and visual disturbance.   Respiratory:  Negative for cough, shortness of breath and wheezing.    Cardiovascular:  Negative for chest pain, palpitations and leg swelling.   Gastrointestinal:  Negative for abdominal pain, blood in stool, constipation, diarrhea, nausea and vomiting.   Endocrine: Negative for cold intolerance, heat intolerance and polyuria.   Genitourinary:  Negative for dysuria, flank pain and hematuria.   Musculoskeletal:  Negative for arthralgias, gait problem, joint swelling and myalgias.   Skin:  Negative for rash.        LUMP ON RIGHT SCALP   Allergic/Immunologic: Negative for environmental allergies and food allergies.   Neurological:  Positive for dizziness, syncope and headaches. Negative for weakness, light-headedness and  numbness.        CONFUSION    Psychiatric/Behavioral:  Negative for dysphoric mood and sleep disturbance. The patient is not nervous/anxious.        Objective   /68   Pulse 80   Temp 36.4 °C (97.6 °F) (Temporal)   Wt 73.5 kg (162 lb)   SpO2 98%   BMI 21.97 kg/m²     Physical Exam  Vitals and nursing note reviewed.   Constitutional:       Appearance: Normal appearance.   HENT:      Head: Normocephalic.   Cardiovascular:      Rate and Rhythm: Normal rate and regular rhythm.      Pulses: Normal pulses.      Heart sounds: Normal heart sounds. No murmur heard.     No friction rub. No gallop.   Pulmonary:      Effort: Pulmonary effort is normal. No respiratory distress.      Breath sounds: Normal breath sounds. No wheezing.   Abdominal:      General: Bowel sounds are normal. There is no distension.      Palpations: Abdomen is soft.      Tenderness: There is no abdominal tenderness.   Musculoskeletal:         General: Signs of injury present. No deformity. Normal range of motion.      Comments: LARGE HEMATOMA RIGHT PARIETAL AREA    Skin:     General: Skin is warm and dry.   Neurological:      General: No focal deficit present.      Mental Status: He is alert and oriented to person, place, and time. Mental status is at baseline.   Psychiatric:         Mood and Affect: Mood normal.         Assessment/Plan   Problem List Items Addressed This Visit             ICD-10-CM    Concussion wth loss of consciousness of 30 minutes or less S06.0X1A    Head trauma - Primary S09.90XA

## 2024-10-13 ENCOUNTER — OFFICE VISIT (OUTPATIENT)
Dept: URGENT CARE | Facility: URGENT CARE | Age: 24
End: 2024-10-13
Payer: COMMERCIAL

## 2024-10-13 VITALS
TEMPERATURE: 98.1 F | SYSTOLIC BLOOD PRESSURE: 105 MMHG | OXYGEN SATURATION: 98 % | WEIGHT: 170.42 LBS | BODY MASS INDEX: 23.11 KG/M2 | HEART RATE: 62 BPM | RESPIRATION RATE: 18 BRPM | DIASTOLIC BLOOD PRESSURE: 72 MMHG

## 2024-10-13 DIAGNOSIS — R06.2 WHEEZE: ICD-10-CM

## 2024-10-13 DIAGNOSIS — J40 BRONCHITIS: Primary | ICD-10-CM

## 2024-10-13 DIAGNOSIS — R05.1 ACUTE COUGH: ICD-10-CM

## 2024-10-13 PROCEDURE — 99213 OFFICE O/P EST LOW 20 MIN: CPT | Performed by: NURSE PRACTITIONER

## 2024-10-13 PROCEDURE — 1036F TOBACCO NON-USER: CPT | Performed by: NURSE PRACTITIONER

## 2024-10-13 RX ORDER — PREDNISONE 20 MG/1
TABLET ORAL
Qty: 30 TABLET | Refills: 0 | Status: SHIPPED | OUTPATIENT
Start: 2024-10-13 | End: 2024-10-28

## 2024-10-13 RX ORDER — DOXYCYCLINE 100 MG/1
100 CAPSULE ORAL 2 TIMES DAILY
Qty: 20 CAPSULE | Refills: 0 | Status: SHIPPED | OUTPATIENT
Start: 2024-10-13 | End: 2024-10-23

## 2024-10-13 RX ORDER — ALBUTEROL SULFATE 90 UG/1
2 INHALANT RESPIRATORY (INHALATION) EVERY 4 HOURS PRN
Qty: 6.7 G | Refills: 0 | Status: SHIPPED | OUTPATIENT
Start: 2024-10-13 | End: 2025-10-13

## 2024-10-13 ASSESSMENT — ENCOUNTER SYMPTOMS
SORE THROAT: 1
GASTROINTESTINAL NEGATIVE: 1
CARDIOVASCULAR NEGATIVE: 1
SINUS PAIN: 1
ENDOCRINE NEGATIVE: 1
HEADACHES: 1
RHINORRHEA: 1
MUSCULOSKELETAL NEGATIVE: 1
WHEEZING: 1
FATIGUE: 1
ALLERGIC/IMMUNOLOGIC NEGATIVE: 1
PSYCHIATRIC NEGATIVE: 1
COUGH: 1
HEMATOLOGIC/LYMPHATIC NEGATIVE: 1
EYES NEGATIVE: 1
SINUS PRESSURE: 1

## 2024-10-13 NOTE — PROGRESS NOTES
Subjective   Patient ID: Bruno Anders is a 24 y.o. male. They present today with a chief complaint of Cough and Sinusitis (Burning sensation over sternum as well x 2 days).    History of Present Illness    Cough  This is a new problem. The current episode started in the past 7 days. The problem has been gradually worsening. The problem occurs every few hours. The cough is Productive of brown sputum. Associated symptoms include headaches, nasal congestion, rhinorrhea, a sore throat and wheezing. He has tried a beta-agonist inhaler for the symptoms.   Sinusitis  Pain details:     Location:  Maxillary    Quality:  Pressure and sharp  Associated symptoms: cough, fatigue, headaches, rhinorrhea, sore throat and wheezing        Past Medical History  Allergies as of 10/13/2024 - Reviewed 10/13/2024   Allergen Reaction Noted    Pepto-bismol [bismuth subsalicylate] Dry mouth 05/24/2023    Tramadol Itching 09/06/2024       (Not in a hospital admission)       Past Medical History:   Diagnosis Date    Chlamydial infection, unspecified 01/07/2019    Chlamydia infection    Contact with and (suspected) exposure to infections with a predominantly sexual mode of transmission 01/04/2019    Exposure to STD    Fever, unspecified 06/06/2018    Dehydration fever    Gastritis, unspecified, without bleeding 01/29/2018    Peptic gastritis    Noninfective gastroenteritis and colitis, unspecified 06/04/2021    Gastroenteritis, acute    Other specified health status     Known health problems: none    Pain in right shoulder 05/24/2016    Acute pain of right shoulder    Personal history of other diseases of the respiratory system 11/18/2016    History of sore throat    Personal history of other specified conditions 01/04/2019    History of dysuria    Personal history of pneumonia (recurrent) 03/04/2020    History of community acquired pneumonia    Postnasal drip 11/18/2016    Post-nasal drainage    Unspecified abdominal pain 06/04/2021     Abdominal cramping       Past Surgical History:   Procedure Laterality Date    KNEE ARTHROSCOPY W/ DEBRIDEMENT  12/29/2014    Arthroscopy Knee    OTHER SURGICAL HISTORY  06/06/2018    Dental Surgery    TONSILLECTOMY  12/29/2014    Tonsillectomy        reports that he has never smoked. He has quit using smokeless tobacco.  His smokeless tobacco use included chew. He reports that he does not currently use alcohol after a past usage of about 7.0 standard drinks of alcohol per week. He reports that he does not use drugs.    Review of Systems  Review of Systems   Constitutional:  Positive for fatigue.   HENT:  Positive for rhinorrhea, sinus pressure, sinus pain and sore throat.    Eyes: Negative.    Respiratory:  Positive for cough and wheezing.    Cardiovascular: Negative.    Gastrointestinal: Negative.    Endocrine: Negative.    Genitourinary: Negative.    Musculoskeletal: Negative.    Skin: Negative.    Allergic/Immunologic: Negative.    Neurological:  Positive for headaches.   Hematological: Negative.    Psychiatric/Behavioral: Negative.                                    Objective    Vitals:    10/13/24 1145   BP: 105/72   Pulse: 62   Resp: 18   Temp: 36.7 °C (98.1 °F)   SpO2: 98%   Weight: 77.3 kg (170 lb 6.7 oz)     No LMP for male patient.    Physical Exam  Vitals and nursing note reviewed.   Constitutional:       Appearance: Normal appearance. He is normal weight.   HENT:      Head: Normocephalic and atraumatic.      Right Ear: Tympanic membrane normal.      Left Ear: Tympanic membrane normal.      Nose: Congestion and rhinorrhea present.      Mouth/Throat:      Mouth: Mucous membranes are dry.   Cardiovascular:      Rate and Rhythm: Normal rate and regular rhythm.      Pulses: Normal pulses.      Heart sounds: Normal heart sounds.   Pulmonary:      Breath sounds: Examination of the right-middle field reveals wheezing. Examination of the left-middle field reveals wheezing. Examination of the right-lower field  reveals decreased breath sounds and wheezing. Examination of the left-lower field reveals decreased breath sounds and wheezing. Decreased breath sounds and wheezing present.   Abdominal:      General: Bowel sounds are normal.      Palpations: Abdomen is soft.   Musculoskeletal:      Cervical back: Normal range of motion.   Skin:     General: Skin is warm and dry.      Capillary Refill: Capillary refill takes 2 to 3 seconds.   Neurological:      General: No focal deficit present.      Mental Status: He is alert and oriented to person, place, and time. Mental status is at baseline.   Psychiatric:         Mood and Affect: Mood normal.         Procedures    Point of Care Test & Imaging Results from this visit  No results found for this visit on 10/13/24.   No results found.    Diagnostic study results (if any) were reviewed by [facility].    Assessment/Plan   Allergies, medications, history, and pertinent labs/EKGs/Imaging reviewed by MOISES Bah-CNP.     Medical Decision Making  Medical Decision Making  At time of discharge patient was clinically well-appearing and HDS for outpatient management. The patient and/or family was educated regarding diagnosis, supportive care, OTC and Rx medications. The patient and/or family was given the opportunity to ask questions prior to discharge.  They verbalized understanding of my discussion of the plans for treatment, expected course, indications to return to  or seek further evaluation in ED, and the need for timely follow up as directed.   They were provided with a work/school excuse if requested.        Orders and Diagnoses  Diagnoses and all orders for this visit:  Bronchitis  -     doxycycline (Vibramycin) 100 mg capsule; Take 1 capsule (100 mg) by mouth 2 times a day for 10 days. Take with at least 8 ounces (large glass) of water, do not lie down for 30 minutes after  -     predniSONE (Deltasone) 20 mg tablet; Take 1 tablet (20 mg) by mouth 3 times a  day for 5 days, THEN 1 tablet (20 mg) 2 times a day for 5 days, THEN 1 tablet (20 mg) once daily for 5 days.  -     albuterol (Proventil HFA) 90 mcg/actuation inhaler; Inhale 2 puffs every 4 hours if needed for wheezing or shortness of breath.  Acute cough  -     doxycycline (Vibramycin) 100 mg capsule; Take 1 capsule (100 mg) by mouth 2 times a day for 10 days. Take with at least 8 ounces (large glass) of water, do not lie down for 30 minutes after  -     predniSONE (Deltasone) 20 mg tablet; Take 1 tablet (20 mg) by mouth 3 times a day for 5 days, THEN 1 tablet (20 mg) 2 times a day for 5 days, THEN 1 tablet (20 mg) once daily for 5 days.  -     albuterol (Proventil HFA) 90 mcg/actuation inhaler; Inhale 2 puffs every 4 hours if needed for wheezing or shortness of breath.  Wheeze  -     doxycycline (Vibramycin) 100 mg capsule; Take 1 capsule (100 mg) by mouth 2 times a day for 10 days. Take with at least 8 ounces (large glass) of water, do not lie down for 30 minutes after  -     predniSONE (Deltasone) 20 mg tablet; Take 1 tablet (20 mg) by mouth 3 times a day for 5 days, THEN 1 tablet (20 mg) 2 times a day for 5 days, THEN 1 tablet (20 mg) once daily for 5 days.  -     albuterol (Proventil HFA) 90 mcg/actuation inhaler; Inhale 2 puffs every 4 hours if needed for wheezing or shortness of breath.    Return to Urgent care if symptoms return or progress  Follow up with PCP in 1-2 weeks   Medical Admin Record      Patient disposition: Home    Electronically signed by [facility]  12:40 PM

## 2025-01-01 ENCOUNTER — HOSPITAL ENCOUNTER (EMERGENCY)
Facility: HOSPITAL | Age: 25
Discharge: HOME | End: 2025-01-01
Attending: FAMILY MEDICINE
Payer: COMMERCIAL

## 2025-01-01 VITALS
HEART RATE: 70 BPM | SYSTOLIC BLOOD PRESSURE: 127 MMHG | TEMPERATURE: 97.7 F | DIASTOLIC BLOOD PRESSURE: 65 MMHG | HEIGHT: 72 IN | BODY MASS INDEX: 23.03 KG/M2 | OXYGEN SATURATION: 99 % | WEIGHT: 170 LBS | RESPIRATION RATE: 16 BRPM

## 2025-01-01 DIAGNOSIS — B07.0 PLANTAR WART: Primary | ICD-10-CM

## 2025-01-01 PROCEDURE — 99282 EMERGENCY DEPT VISIT SF MDM: CPT

## 2025-01-01 PROCEDURE — 99281 EMR DPT VST MAYX REQ PHY/QHP: CPT | Performed by: FAMILY MEDICINE

## 2025-01-01 ASSESSMENT — PAIN DESCRIPTION - LOCATION: LOCATION: FOOT

## 2025-01-01 ASSESSMENT — PAIN DESCRIPTION - ORIENTATION: ORIENTATION: RIGHT

## 2025-01-01 ASSESSMENT — PAIN SCALES - GENERAL
PAINLEVEL_OUTOF10: 9
PAINLEVEL_OUTOF10: 9

## 2025-01-01 ASSESSMENT — PAIN - FUNCTIONAL ASSESSMENT: PAIN_FUNCTIONAL_ASSESSMENT: 0-10

## 2025-01-01 ASSESSMENT — PAIN DESCRIPTION - DESCRIPTORS: DESCRIPTORS: ACHING

## 2025-01-01 ASSESSMENT — PAIN DESCRIPTION - PAIN TYPE: TYPE: CHRONIC PAIN

## 2025-01-01 NOTE — ED TRIAGE NOTES
"Pt states to have a wart on his right foot by his second toe. States he has had this for 1 year now, but is afraid it's \"getting worse\"  "

## 2025-01-02 NOTE — ED PROVIDER NOTES
HPI   Chief Complaint   Patient presents with    warts on foot       24-year-old male comes the ED with complaint of persistent warts across the second toe of his right foot for the last year.  Patient reports it is continued persist despite his attempts at trying to treat it and today he was fed up with it while at work and came to the ED to have it checked out.  Patient reports no other associate symptoms or complaints at this time.      History provided by:  Patient and medical records   used: No            Patient History   Past Medical History:   Diagnosis Date    Chlamydial infection, unspecified 01/07/2019    Chlamydia infection    Contact with and (suspected) exposure to infections with a predominantly sexual mode of transmission 01/04/2019    Exposure to STD    Fever, unspecified 06/06/2018    Dehydration fever    Gastritis, unspecified, without bleeding 01/29/2018    Peptic gastritis    Noninfective gastroenteritis and colitis, unspecified 06/04/2021    Gastroenteritis, acute    Other specified health status     Known health problems: none    Pain in right shoulder 05/24/2016    Acute pain of right shoulder    Personal history of other diseases of the respiratory system 11/18/2016    History of sore throat    Personal history of other specified conditions 01/04/2019    History of dysuria    Personal history of pneumonia (recurrent) 03/04/2020    History of community acquired pneumonia    Postnasal drip 11/18/2016    Post-nasal drainage    Unspecified abdominal pain 06/04/2021    Abdominal cramping     Past Surgical History:   Procedure Laterality Date    KNEE ARTHROSCOPY W/ DEBRIDEMENT  12/29/2014    Arthroscopy Knee    OTHER SURGICAL HISTORY  06/06/2018    Dental Surgery    TONSILLECTOMY  12/29/2014    Tonsillectomy     Family History   Problem Relation Name Age of Onset    Diverticulosis Mother      Macular degeneration Mother      Montez's esophagus Mother      No Known Problems  Father       Social History     Tobacco Use    Smoking status: Never    Smokeless tobacco: Former     Types: Chew   Vaping Use    Vaping status: Every Day    Substances: Nicotine    Devices: Disposable, Pre-filled pod   Substance Use Topics    Alcohol use: Not Currently     Alcohol/week: 7.0 standard drinks of alcohol     Types: 7 Cans of beer per week    Drug use: Never       Physical Exam   ED Triage Vitals [01/01/25 1019]   Temperature Heart Rate Respirations BP   36.5 °C (97.7 °F) 70 16 127/65      SpO2 Temp Source Heart Rate Source Patient Position   99 % Temporal Monitor Sitting      BP Location FiO2 (%)     Right arm --       Physical Exam  Vitals and nursing note reviewed.   Constitutional:       General: He is not in acute distress.     Appearance: He is well-developed.   HENT:      Head: Normocephalic and atraumatic.   Eyes:      Conjunctiva/sclera: Conjunctivae normal.   Cardiovascular:      Rate and Rhythm: Normal rate and regular rhythm.      Heart sounds: No murmur heard.  Pulmonary:      Effort: Pulmonary effort is normal. No respiratory distress.      Breath sounds: Normal breath sounds.   Abdominal:      Palpations: Abdomen is soft.      Tenderness: There is no abdominal tenderness.   Musculoskeletal:         General: No swelling.      Cervical back: Neck supple.   Skin:     General: Skin is warm and dry.      Capillary Refill: Capillary refill takes less than 2 seconds.      Findings: Lesion present.             Comments: Area of skin changes and lesion across the bottom of the right second digit consistent with plantar warts   Neurological:      Mental Status: He is alert.   Psychiatric:         Mood and Affect: Mood normal.           ED Course & MDM   Diagnoses as of 01/01/25 1913   Plantar wart                 No data recorded                                 Medical Decision Making  Pt upon arrival to the ED appeared to be comfortable and in no distress with stable vitals.  Discussed with pt the  presenting complaints and clinically findings.  Reviewed with pt the epic chart and counseled the patient on warts like symptoms and appropriate approach to management/treatments.  After assessment and evaluation advised of exam appear to be consistent with plantar warts the patient was concerned about and patient was given structures regarding use of over-the-counter medications for management and treatment.  Patient is also given resources to contact and follow-up with podiatry to further reassess and manage his chronic complaint.  Patient stable this time and discharged home.    Amount and/or Complexity of Data Reviewed  External Data Reviewed: labs, radiology and notes.    Risk  OTC drugs.        Procedure  Procedures     Ray Siu MD  01/01/25 9942

## 2025-01-25 ENCOUNTER — OFFICE VISIT (OUTPATIENT)
Dept: URGENT CARE | Facility: URGENT CARE | Age: 25
End: 2025-01-25
Payer: COMMERCIAL

## 2025-01-25 VITALS
SYSTOLIC BLOOD PRESSURE: 127 MMHG | BODY MASS INDEX: 23.11 KG/M2 | HEART RATE: 109 BPM | OXYGEN SATURATION: 98 % | RESPIRATION RATE: 17 BRPM | WEIGHT: 170.42 LBS | DIASTOLIC BLOOD PRESSURE: 79 MMHG | TEMPERATURE: 98.5 F

## 2025-01-25 DIAGNOSIS — K52.9 GASTROENTERITIS: Primary | ICD-10-CM

## 2025-01-25 DIAGNOSIS — R11.2 NAUSEA AND VOMITING, UNSPECIFIED VOMITING TYPE: ICD-10-CM

## 2025-01-25 PROBLEM — K92.1 BLOOD IN STOOL: Status: ACTIVE | Noted: 2025-01-25

## 2025-01-25 PROBLEM — R10.9 ABDOMINAL PAIN: Status: RESOLVED | Noted: 2024-02-26 | Resolved: 2025-01-25

## 2025-01-25 PROBLEM — K21.9 GASTROESOPHAGEAL REFLUX DISEASE: Status: ACTIVE | Noted: 2025-01-25

## 2025-01-25 PROBLEM — B08.4 ENTEROVIRAL VESICULAR STOMATITIS WITH EXANTHEM: Status: ACTIVE | Noted: 2025-01-25

## 2025-01-25 PROBLEM — Z11.52 ENCOUNTER FOR SCREENING FOR COVID-19: Status: ACTIVE | Noted: 2023-10-02

## 2025-01-25 PROBLEM — K27.9 PUD (PEPTIC ULCER DISEASE): Status: ACTIVE | Noted: 2025-01-25

## 2025-01-25 PROBLEM — K27.9 PEPTIC ULCER: Status: ACTIVE | Noted: 2025-01-25

## 2025-01-25 PROBLEM — K58.0 IRRITABLE BOWEL SYNDROME WITH DIARRHEA: Status: ACTIVE | Noted: 2025-01-25

## 2025-01-25 PROCEDURE — 99214 OFFICE O/P EST MOD 30 MIN: CPT | Performed by: FAMILY MEDICINE

## 2025-01-25 PROCEDURE — 1036F TOBACCO NON-USER: CPT | Performed by: FAMILY MEDICINE

## 2025-01-25 PROCEDURE — 99070 SPECIAL SUPPLIES PHYS/QHP: CPT | Performed by: FAMILY MEDICINE

## 2025-01-25 RX ORDER — ONDANSETRON 4 MG/1
4 TABLET, ORALLY DISINTEGRATING ORAL EVERY 8 HOURS PRN
Qty: 20 TABLET | Refills: 0 | Status: SHIPPED | OUTPATIENT
Start: 2025-01-25 | End: 2025-02-01

## 2025-01-25 NOTE — PROGRESS NOTES
Subjective   Patient ID: Bruno Anders is a 24 y.o. male.    HPI    The following portions of the chart were reviewed this encounter and updated as appropriate:         Review of Systems  Objective   Physical Exam  Procedures    Assessment/Plan   {Assess/PlanSmartLinks:57507}    Patient disposition: { Disposition:19538}   midline  Posterior pharynx moderately erythematous with no concretions or exudates present  No cervical lymphadenopathy palpated    Heart has regular rate and rhythm. No murmurs, rubs or gallops are auscultated at this exam.    Respiratory rate rhythm and effort are normal. Breath sounds bilaterally are clear on auscultation without crackles, rhonchi, wheezes or friction rub.    Abdomen: Normal bowel sounds on auscultation. Soft, nontender without rebound or rigidity on palpation  Procedures    Assessment/Plan   Diagnoses and all orders for this visit:  Gastroenteritis  Nausea and vomiting, unspecified vomiting type  -     ondansetron ODT (Zofran-ODT) 4 mg disintegrating tablet; Dissolve 1 tablet (4 mg) in the mouth every 8 hours if needed for nausea or vomiting for up to 7 days.    Patient disposition: Home

## 2025-01-25 NOTE — Clinical Note
January 25, 2025     Patient: Bruno Anders   YOB: 2000   Date of Visit: 1/25/2025       To Whom It May Concern:    Bruno Anders was seen in my clinic on 1/25/2025 at 5:15 pm. Please excuse Bruno for his absence from work on this day to make the appointment.    If you have any questions or concerns, please don't hesitate to call.         Sincerely,         Celmente Leach,         CC: No Recipients

## 2025-01-25 NOTE — PATIENT INSTRUCTIONS
You have gastroenteritis. This is most likely of viral illness. Most commonly this is associated with vomiting and diarrhea. Because this is a viral illness, antibiotics are not beneficial. Unfortunately the illness has to run its course which is typically 3-5 days.  Increase your oral fluids for the next 7-10 days  You have been given a prescription for Zofran, a medicine to help control vomiting. You may use this as directed if needed to control your nausea and vomiting for the next 24-48 hours.  After episode of vomiting, wait at least 2 hours and take nothing by mouth. Then begin with small amounts of clear room temperature liquids, initially try to sip 2-4 ounces per hour. You may advance and increase the liquids as tolerated. Continue taking fluids for 24 hours. If no further vomiting in 24 hours, you may advance to a bland, dry, low residue with solid foods such as toast and crackers.  Continue advancing as tolerated to regular diet. Avoid fruit juices, fresh fruits, fresh vegetables especially leafy green vegetables and high-fiber foods until bowel function has returned to normal. Please reserve dairy products, fatty foods, fried foods until you are feeling better and vomiting and diarrhea have subsided  If no better in 5-7 days please follow-up with your primary care provider  If fever greater than 102°F, chills, increased vomiting, vomiting blood, vomiting for more than 24-48 hours, increased abdominal pain, difficulty swallowing, worsening diarrhea or bloody diarrhea go to emergency department for immediate evaluation and treatment  This note was generated by voice recognition software. Minor transcription/grammatical errors may be present. Please call for clarification.   N/A

## 2025-01-25 NOTE — Clinical Note
January 25, 2025     Patient: Bruno Anders   YOB: 2000   Date of Visit: 1/25/2025       To Whom It May Concern:    Bruno Anders was seen in my clinic on 1/25/2025 at 5:15 pm. Please excuse Bruno for his absence from work on this day to make the appointment.    If you have any questions or concerns, please don't hesitate to call.         Sincerely,         Clemente Leach,         CC: No Recipients

## 2025-01-25 NOTE — LETTER
January 25, 2025     Patient: Bruno Anders   YOB: 2000   Date of Visit: 1/25/2025       To Whom It May Concern:    Bruno Anders was seen in my clinic on 1/25/2025 at 5:15 pm. Please excuse Bruno for his absence from work on this day to make the appointment. He may return 01/27/2025.     If you have any questions or concerns, please don't hesitate to call.         Sincerely,         Clemente Leach,         CC: No Recipients

## 2025-01-29 ASSESSMENT — ENCOUNTER SYMPTOMS
NAUSEA: 1
SINUS PRESSURE: 0
SHORTNESS OF BREATH: 1
CHILLS: 1
ABDOMINAL PAIN: 0
SORE THROAT: 0
DYSURIA: 0
HEADACHES: 1
RHINORRHEA: 0
FREQUENCY: 0
VOMITING: 1
WHEEZING: 0
PALPITATIONS: 0
FEVER: 1
CHEST TIGHTNESS: 0
CONSTIPATION: 0
DIARRHEA: 1
COUGH: 0

## 2025-03-23 ENCOUNTER — APPOINTMENT (OUTPATIENT)
Dept: RADIOLOGY | Facility: HOSPITAL | Age: 25
End: 2025-03-23
Payer: COMMERCIAL

## 2025-03-23 ENCOUNTER — HOSPITAL ENCOUNTER (EMERGENCY)
Facility: HOSPITAL | Age: 25
Discharge: HOME | End: 2025-03-23
Attending: FAMILY MEDICINE
Payer: COMMERCIAL

## 2025-03-23 VITALS
RESPIRATION RATE: 17 BRPM | HEIGHT: 72 IN | WEIGHT: 170 LBS | HEART RATE: 91 BPM | TEMPERATURE: 97.8 F | OXYGEN SATURATION: 100 % | SYSTOLIC BLOOD PRESSURE: 136 MMHG | DIASTOLIC BLOOD PRESSURE: 89 MMHG | BODY MASS INDEX: 23.03 KG/M2

## 2025-03-23 DIAGNOSIS — R10.33 PERIUMBILICAL ABDOMINAL PAIN: Primary | ICD-10-CM

## 2025-03-23 LAB
ALBUMIN SERPL BCP-MCNC: 4.8 G/DL (ref 3.4–5)
ALP SERPL-CCNC: 106 U/L (ref 33–120)
ALT SERPL W P-5'-P-CCNC: 20 U/L (ref 10–52)
ANION GAP SERPL CALC-SCNC: 12 MMOL/L (ref 10–20)
APPEARANCE UR: CLEAR
AST SERPL W P-5'-P-CCNC: 22 U/L (ref 9–39)
BASOPHILS # BLD AUTO: 0.06 X10*3/UL (ref 0–0.1)
BASOPHILS NFR BLD AUTO: 0.5 %
BILIRUB SERPL-MCNC: 0.5 MG/DL (ref 0–1.2)
BILIRUB UR STRIP.AUTO-MCNC: NEGATIVE MG/DL
BUN SERPL-MCNC: 14 MG/DL (ref 6–23)
CALCIUM SERPL-MCNC: 8.7 MG/DL (ref 8.6–10.3)
CHLORIDE SERPL-SCNC: 105 MMOL/L (ref 98–107)
CO2 SERPL-SCNC: 25 MMOL/L (ref 21–32)
COLOR UR: ABNORMAL
CREAT SERPL-MCNC: 0.87 MG/DL (ref 0.5–1.3)
EGFRCR SERPLBLD CKD-EPI 2021: >90 ML/MIN/1.73M*2
EOSINOPHIL # BLD AUTO: 0.21 X10*3/UL (ref 0–0.7)
EOSINOPHIL NFR BLD AUTO: 1.8 %
ERYTHROCYTE [DISTWIDTH] IN BLOOD BY AUTOMATED COUNT: 11.9 % (ref 11.5–14.5)
GLUCOSE SERPL-MCNC: 97 MG/DL (ref 74–99)
GLUCOSE UR STRIP.AUTO-MCNC: NEGATIVE MG/DL
HCT VFR BLD AUTO: 44.2 % (ref 41–52)
HGB BLD-MCNC: 15.9 G/DL (ref 13.5–17.5)
HOLD SPECIMEN: NORMAL
IMM GRANULOCYTES # BLD AUTO: 0.06 X10*3/UL (ref 0–0.7)
IMM GRANULOCYTES NFR BLD AUTO: 0.5 % (ref 0–0.9)
KETONES UR STRIP.AUTO-MCNC: NEGATIVE MG/DL
LEUKOCYTE ESTERASE UR QL STRIP.AUTO: NEGATIVE
LIPASE SERPL-CCNC: 40 U/L (ref 9–82)
LYMPHOCYTES # BLD AUTO: 2.14 X10*3/UL (ref 1.2–4.8)
LYMPHOCYTES NFR BLD AUTO: 17.9 %
MAGNESIUM SERPL-MCNC: 1.98 MG/DL (ref 1.6–2.4)
MCH RBC QN AUTO: 30.5 PG (ref 26–34)
MCHC RBC AUTO-ENTMCNC: 36 G/DL (ref 32–36)
MCV RBC AUTO: 85 FL (ref 80–100)
MONOCYTES # BLD AUTO: 1.08 X10*3/UL (ref 0.1–1)
MONOCYTES NFR BLD AUTO: 9 %
NEUTROPHILS # BLD AUTO: 8.41 X10*3/UL (ref 1.2–7.7)
NEUTROPHILS NFR BLD AUTO: 70.3 %
NITRITE UR QL STRIP.AUTO: NEGATIVE
NRBC BLD-RTO: 0 /100 WBCS (ref 0–0)
PH UR STRIP.AUTO: 7 [PH]
PLATELET # BLD AUTO: 219 X10*3/UL (ref 150–450)
POTASSIUM SERPL-SCNC: 3.7 MMOL/L (ref 3.5–5.3)
PROT SERPL-MCNC: 6.8 G/DL (ref 6.4–8.2)
PROT UR STRIP.AUTO-MCNC: NEGATIVE MG/DL
RBC # BLD AUTO: 5.21 X10*6/UL (ref 4.5–5.9)
RBC # UR STRIP.AUTO: NEGATIVE MG/DL
SODIUM SERPL-SCNC: 138 MMOL/L (ref 136–145)
SP GR UR STRIP.AUTO: 1.06
UROBILINOGEN UR STRIP.AUTO-MCNC: <2 MG/DL
WBC # BLD AUTO: 12 X10*3/UL (ref 4.4–11.3)

## 2025-03-23 PROCEDURE — 2500000004 HC RX 250 GENERAL PHARMACY W/ HCPCS (ALT 636 FOR OP/ED): Performed by: EMERGENCY MEDICINE

## 2025-03-23 PROCEDURE — 2500000004 HC RX 250 GENERAL PHARMACY W/ HCPCS (ALT 636 FOR OP/ED)

## 2025-03-23 PROCEDURE — 96374 THER/PROPH/DIAG INJ IV PUSH: CPT | Mod: 59

## 2025-03-23 PROCEDURE — 81003 URINALYSIS AUTO W/O SCOPE: CPT | Performed by: EMERGENCY MEDICINE

## 2025-03-23 PROCEDURE — 85025 COMPLETE CBC W/AUTO DIFF WBC: CPT | Performed by: EMERGENCY MEDICINE

## 2025-03-23 PROCEDURE — 83690 ASSAY OF LIPASE: CPT | Performed by: EMERGENCY MEDICINE

## 2025-03-23 PROCEDURE — 74177 CT ABD & PELVIS W/CONTRAST: CPT | Performed by: RADIOLOGY

## 2025-03-23 PROCEDURE — 99285 EMERGENCY DEPT VISIT HI MDM: CPT | Mod: 25 | Performed by: FAMILY MEDICINE

## 2025-03-23 PROCEDURE — 83735 ASSAY OF MAGNESIUM: CPT | Performed by: FAMILY MEDICINE

## 2025-03-23 PROCEDURE — 96361 HYDRATE IV INFUSION ADD-ON: CPT

## 2025-03-23 PROCEDURE — 80053 COMPREHEN METABOLIC PANEL: CPT | Performed by: EMERGENCY MEDICINE

## 2025-03-23 PROCEDURE — 2550000001 HC RX 255 CONTRASTS: Performed by: EMERGENCY MEDICINE

## 2025-03-23 PROCEDURE — 96375 TX/PRO/DX INJ NEW DRUG ADDON: CPT

## 2025-03-23 PROCEDURE — 74177 CT ABD & PELVIS W/CONTRAST: CPT

## 2025-03-23 PROCEDURE — 36415 COLL VENOUS BLD VENIPUNCTURE: CPT | Performed by: EMERGENCY MEDICINE

## 2025-03-23 RX ORDER — ONDANSETRON HYDROCHLORIDE 2 MG/ML
4 INJECTION, SOLUTION INTRAVENOUS ONCE
Status: COMPLETED | OUTPATIENT
Start: 2025-03-23 | End: 2025-03-23

## 2025-03-23 RX ORDER — FAMOTIDINE 10 MG/ML
INJECTION, SOLUTION INTRAVENOUS
Status: COMPLETED
Start: 2025-03-23 | End: 2025-03-23

## 2025-03-23 RX ORDER — KETOROLAC TROMETHAMINE 15 MG/ML
15 INJECTION, SOLUTION INTRAMUSCULAR; INTRAVENOUS ONCE
Status: COMPLETED | OUTPATIENT
Start: 2025-03-23 | End: 2025-03-23

## 2025-03-23 RX ORDER — FAMOTIDINE 10 MG/ML
20 INJECTION, SOLUTION INTRAVENOUS ONCE
Status: COMPLETED | OUTPATIENT
Start: 2025-03-23 | End: 2025-03-23

## 2025-03-23 RX ORDER — KETOROLAC TROMETHAMINE 15 MG/ML
INJECTION, SOLUTION INTRAMUSCULAR; INTRAVENOUS
Status: COMPLETED
Start: 2025-03-23 | End: 2025-03-23

## 2025-03-23 RX ORDER — ONDANSETRON HYDROCHLORIDE 2 MG/ML
INJECTION, SOLUTION INTRAVENOUS
Status: COMPLETED
Start: 2025-03-23 | End: 2025-03-23

## 2025-03-23 RX ADMIN — KETOROLAC TROMETHAMINE 15 MG: 15 INJECTION, SOLUTION INTRAMUSCULAR; INTRAVENOUS at 08:51

## 2025-03-23 RX ADMIN — ONDANSETRON HYDROCHLORIDE 4 MG: 2 INJECTION, SOLUTION INTRAVENOUS at 08:51

## 2025-03-23 RX ADMIN — ONDANSETRON 4 MG: 2 INJECTION INTRAMUSCULAR; INTRAVENOUS at 08:51

## 2025-03-23 RX ADMIN — FAMOTIDINE 20 MG: 10 INJECTION, SOLUTION INTRAVENOUS at 08:51

## 2025-03-23 RX ADMIN — IOHEXOL 75 ML: 350 INJECTION, SOLUTION INTRAVENOUS at 07:58

## 2025-03-23 RX ADMIN — SODIUM CHLORIDE 1000 ML: 9 INJECTION, SOLUTION INTRAVENOUS at 08:19

## 2025-03-23 ASSESSMENT — PAIN - FUNCTIONAL ASSESSMENT
PAIN_FUNCTIONAL_ASSESSMENT: 0-10
PAIN_FUNCTIONAL_ASSESSMENT: 0-10

## 2025-03-23 ASSESSMENT — PAIN SCALES - GENERAL
PAINLEVEL_OUTOF10: 3
PAINLEVEL_OUTOF10: 8

## 2025-03-23 ASSESSMENT — COLUMBIA-SUICIDE SEVERITY RATING SCALE - C-SSRS
2. HAVE YOU ACTUALLY HAD ANY THOUGHTS OF KILLING YOURSELF?: NO
6. HAVE YOU EVER DONE ANYTHING, STARTED TO DO ANYTHING, OR PREPARED TO DO ANYTHING TO END YOUR LIFE?: NO
1. IN THE PAST MONTH, HAVE YOU WISHED YOU WERE DEAD OR WISHED YOU COULD GO TO SLEEP AND NOT WAKE UP?: NO

## 2025-03-23 ASSESSMENT — PAIN DESCRIPTION - ORIENTATION
ORIENTATION: LEFT;UPPER
ORIENTATION: UPPER;LEFT

## 2025-03-23 ASSESSMENT — PAIN DESCRIPTION - LOCATION
LOCATION: ABDOMEN
LOCATION: ABDOMEN

## 2025-03-23 ASSESSMENT — PAIN DESCRIPTION - PROGRESSION: CLINICAL_PROGRESSION: GRADUALLY IMPROVING

## 2025-03-23 ASSESSMENT — PAIN DESCRIPTION - PAIN TYPE
TYPE: ACUTE PAIN
TYPE: ACUTE PAIN

## 2025-03-23 NOTE — ED PROVIDER NOTES
HPI   Chief Complaint   Patient presents with    Abdominal Pain       24-year-old male with history of GERD and IBS comes ED with complaint of generalized abdominal pain that began early yesterday and continued today.  Patient reports he had bouts of vomiting and diarrhea and it continued to persist and he came in today to have it evaluated.  Patient reports that his belly pain was initially a 10 out of 10 at home but is subsided down to a 6/10 upon arrival to the ED.  Patient reports not take anything thus far to treat it.  Patient reports he has a history of IBS and at times had an episode such as this in the past but nothing recent.  Patient reports the last thing he ate was some frozen food hours before the onset of the symptoms.  Patient in the ED is alert, cooperative, peers uncomfortable, but in no distress.      History provided by:  Spouse, patient and medical records   used: No            Patient History   Past Medical History:   Diagnosis Date    Chlamydial infection, unspecified 01/07/2019    Chlamydia infection    Contact with and (suspected) exposure to infections with a predominantly sexual mode of transmission 01/04/2019    Exposure to STD    Fever, unspecified 06/06/2018    Dehydration fever    Gastritis, unspecified, without bleeding 01/29/2018    Peptic gastritis    Noninfective gastroenteritis and colitis, unspecified 06/04/2021    Gastroenteritis, acute    Other specified health status     Known health problems: none    Pain in right shoulder 05/24/2016    Acute pain of right shoulder    Personal history of other diseases of the respiratory system 11/18/2016    History of sore throat    Personal history of other specified conditions 01/04/2019    History of dysuria    Personal history of pneumonia (recurrent) 03/04/2020    History of community acquired pneumonia    Postnasal drip 11/18/2016    Post-nasal drainage    Unspecified abdominal pain 06/04/2021    Abdominal cramping      Past Surgical History:   Procedure Laterality Date    KNEE ARTHROSCOPY W/ DEBRIDEMENT  12/29/2014    Arthroscopy Knee    OTHER SURGICAL HISTORY  06/06/2018    Dental Surgery    TONSILLECTOMY  12/29/2014    Tonsillectomy     Family History   Problem Relation Name Age of Onset    Diverticulosis Mother      Macular degeneration Mother      Montez's esophagus Mother      No Known Problems Father       Social History     Tobacco Use    Smoking status: Never    Smokeless tobacco: Former     Types: Chew   Vaping Use    Vaping status: Every Day    Substances: Nicotine    Devices: Disposable, Pre-filled pod   Substance Use Topics    Alcohol use: Not Currently     Alcohol/week: 7.0 standard drinks of alcohol     Types: 7 Cans of beer per week    Drug use: Never       Physical Exam   ED Triage Vitals [03/23/25 0727]   Temperature Heart Rate Respirations BP   36.6 °C (97.8 °F) 96 17 (!) 125/107      SpO2 Temp src Heart Rate Source Patient Position   100 % -- -- --      BP Location FiO2 (%)     -- --       Physical Exam  Vitals and nursing note reviewed.   Constitutional:       General: He is not in acute distress.     Appearance: He is well-developed.   HENT:      Head: Normocephalic and atraumatic.   Eyes:      Conjunctiva/sclera: Conjunctivae normal.   Cardiovascular:      Rate and Rhythm: Normal rate and regular rhythm.      Pulses: Normal pulses.      Heart sounds: Normal heart sounds, S1 normal and S2 normal. No murmur heard.  Pulmonary:      Effort: Pulmonary effort is normal. No respiratory distress.      Breath sounds: Normal breath sounds.   Abdominal:      General: Abdomen is flat.      Palpations: Abdomen is soft.      Tenderness: There is abdominal tenderness in the periumbilical area. There is no right CVA tenderness, left CVA tenderness, guarding or rebound.      Hernia: No hernia is present.       Musculoskeletal:         General: No swelling.      Cervical back: Neck supple.   Skin:     General: Skin is  warm and dry.      Capillary Refill: Capillary refill takes less than 2 seconds.   Neurological:      Mental Status: He is alert.   Psychiatric:         Mood and Affect: Mood normal.           ED Course & MDM   Diagnoses as of 03/23/25 0922   Periumbilical abdominal pain                 No data recorded     [place] Coma Scale Score: 15 (03/23/25 0747 : Maya Rebolledo RN)                       Labs Reviewed   CBC WITH AUTO DIFFERENTIAL - Abnormal       Result Value    WBC 12.0 (*)     nRBC 0.0      RBC 5.21      Hemoglobin 15.9      Hematocrit 44.2      MCV 85      MCH 30.5      MCHC 36.0      RDW 11.9      Platelets 219      Neutrophils % 70.3      Immature Granulocytes %, Automated 0.5      Lymphocytes % 17.9      Monocytes % 9.0      Eosinophils % 1.8      Basophils % 0.5      Neutrophils Absolute 8.41 (*)     Immature Granulocytes Absolute, Automated 0.06      Lymphocytes Absolute 2.14      Monocytes Absolute 1.08 (*)     Eosinophils Absolute 0.21      Basophils Absolute 0.06     URINALYSIS WITH REFLEX CULTURE AND MICROSCOPIC - Abnormal    Color, Urine Straw      Appearance, Urine Clear      Specific Gravity, Urine 1.057 (*)     pH, Urine 7.0      Protein, Urine NEGATIVE      Glucose, Urine NEGATIVE      Blood, Urine NEGATIVE      Ketones, Urine NEGATIVE      Bilirubin, Urine NEGATIVE      Urobilinogen, Urine <2.0      Nitrite, Urine NEGATIVE      Leukocyte Esterase, Urine NEGATIVE     COMPREHENSIVE METABOLIC PANEL - Normal    Glucose 97      Sodium 138      Potassium 3.7      Chloride 105      Bicarbonate 25      Anion Gap 12      Urea Nitrogen 14      Creatinine 0.87      eGFR >90      Calcium 8.7      Albumin 4.8      Alkaline Phosphatase 106      Total Protein 6.8      AST 22      Bilirubin, Total 0.5      ALT 20     LIPASE - Normal    Lipase 40      Narrative:     Venipuncture immediately after or during the administration of Metamizole may lead to falsely low results. Testing should be performed immediately  prior to Metamizole dosing.   GRAY TOP    Extra Tube Hold for add-ons.     URINALYSIS WITH REFLEX CULTURE AND MICROSCOPIC    Narrative:     The following orders were created for panel order Urinalysis with Reflex Culture and Microscopic.  Procedure                               Abnormality         Status                     ---------                               -----------         ------                     Urinalysis with Reflex C...[[id number]]  Abnormal            Final result               Extra Urine Gray Tube[[id number]]                            In process                   Please view results for these tests on the individual orders.   EXTRA URINE GRAY TUBE   MAGNESIUM     CT abdomen pelvis w IV contrast   Final Result   1.  Unremarkable exam of the abdomen and pelvis.        MACRO:   1. None        Signed by: Crescencio Matta 3/23/2025 8:43 AM   Dictation workstation:   [id number]            Medical Decision Making  Pt upon arrival to the ED appeared to be uncomfortable but in no distress with stable vitals.  Discussed with pt the presenting complaints and clinically findings.  Reviewed with pt the epic chart and counseled the patient on GI related complaints and appropriate approach to management/treatments.  After assessment and evaluation IV line was started, IV fluids given, labs sent, imaging ordered, given IV Zofran, IV Pepcid, IV Toradol, placed on cardiac monitor, and observed.  After treatment and a period of rest patient was reassessed finally feeling much better, pain had nearly resolved, no longer had any nausea, had no bouts of vomiting or diarrhea in the ED, tolerated p.o. challenge, and vitals continue to be appropriate.  Final results were reviewed/cussed with patient at this time no acute abnormalities are noted on workup.  Patient was encouraged this time start clear liquid diet and advance as tolerated and to contact his GI doctor for follow-up and reassessment of today's visit.  Patient  stable to time and discharged with family.      Amount and/or Complexity of Data Reviewed  External Data Reviewed: labs, radiology and notes.  Labs: ordered. Decision-making details documented in ED Course.  Radiology: ordered. Decision-making details documented in ED Course.        Procedure  Procedures     Ray Siu MD  03/23/25 7133

## 2025-04-12 ENCOUNTER — CLINICAL SUPPORT (OUTPATIENT)
Dept: URGENT CARE | Facility: URGENT CARE | Age: 25
End: 2025-04-12
Payer: COMMERCIAL

## 2025-04-12 VITALS
RESPIRATION RATE: 20 BRPM | SYSTOLIC BLOOD PRESSURE: 124 MMHG | WEIGHT: 170.19 LBS | DIASTOLIC BLOOD PRESSURE: 76 MMHG | HEART RATE: 72 BPM | TEMPERATURE: 98.3 F | BODY MASS INDEX: 23.08 KG/M2 | OXYGEN SATURATION: 99 %

## 2025-04-12 DIAGNOSIS — J06.9 VIRAL URI: ICD-10-CM

## 2025-04-12 DIAGNOSIS — R05.9 COUGH, UNSPECIFIED TYPE: ICD-10-CM

## 2025-04-12 DIAGNOSIS — J20.6 ACUTE BRONCHITIS DUE TO RHINOVIRUS: Primary | ICD-10-CM

## 2025-04-12 LAB
POC CORONAVIRUS SARS-COV-2 PCR: NEGATIVE
POC HUMAN RHINOVIRUS PCR: NEGATIVE
POC INFLUENZA A VIRUS PCR: NEGATIVE
POC INFLUENZA B VIRUS PCR: NEGATIVE
POC RESPIRATORY SYNCYTIAL VIRUS PCR: NEGATIVE

## 2025-04-12 PROCEDURE — 99213 OFFICE O/P EST LOW 20 MIN: CPT | Performed by: FAMILY MEDICINE

## 2025-04-12 PROCEDURE — 87631 RESP VIRUS 3-5 TARGETS: CPT | Performed by: FAMILY MEDICINE

## 2025-04-12 RX ORDER — BENZONATATE 200 MG/1
200 CAPSULE ORAL 3 TIMES DAILY PRN
Qty: 30 CAPSULE | Refills: 0 | Status: SHIPPED | OUTPATIENT
Start: 2025-04-12 | End: 2025-04-19

## 2025-04-12 ASSESSMENT — ENCOUNTER SYMPTOMS
SINUS PRESSURE: 1
HEADACHES: 0
VOMITING: 0
ABDOMINAL PAIN: 0
CHEST TIGHTNESS: 0
SHORTNESS OF BREATH: 1
SORE THROAT: 1
PALPITATIONS: 0
DYSURIA: 0
CONSTIPATION: 0
DIARRHEA: 1
FEVER: 1
CHILLS: 1
COUGH: 1
FREQUENCY: 1
WHEEZING: 0
RHINORRHEA: 1
NAUSEA: 0

## 2025-04-12 NOTE — PROGRESS NOTES
Subjective   Patient ID: Bruno Anders is a 24 y.o. male.      Illness  Associated symptoms: congestion, cough (productive pale yellow), diarrhea, fever (101 degrees fahrenheit), rhinorrhea, shortness of breath and sore throat    Associated symptoms: no abdominal pain, no ear pain, no headaches, no nausea, no vomiting and no wheezing        The following portions of the chart were reviewed this encounter and updated as appropriate:  Tobacco  Allergies  Meds  Problems  Med Hx  Surg Hx  Fam Hx         Review of Systems   Constitutional:  Positive for chills and fever (101 degrees fahrenheit).   HENT:  Positive for congestion, rhinorrhea, sinus pressure and sore throat. Negative for ear pain.    Respiratory:  Positive for cough (productive pale yellow) and shortness of breath. Negative for chest tightness and wheezing.    Cardiovascular:  Negative for palpitations.   Gastrointestinal:  Positive for diarrhea. Negative for abdominal pain, constipation, nausea and vomiting.   Genitourinary:  Positive for frequency. Negative for dysuria.   Neurological:  Negative for headaches.     Objective   Physical Exam  Vital signs are reviewed. Alert and oriented x3 with normal mood and affect  Patient is well nourished, well-developed, alert and in no acute distress    External eyes, orbits, conjunctiva and eyelids are normal in appearance  Pupils are equal, round, reactive to light and accommodation, extraocular movements intact    External ears appear normal  External canals are normal in appearance  Right tympanic membrane is intact and pale gray in appearance  Left tympanic membrane is intact and pale gray in appearance  There is no middle ear effusion noted on the right  There is no middle ear effusion noted on the left  External appearance of the nose is normal  Nasal mucosa, septum, turbinates are moderately reddened and moderately swollen in appearance  There is thin yelow nasal discharge in both nares    Oral mucosa is  uniformly pink and moist  Palate is pink, symmetric and intact  Tongue is moist, mobile and midline  Tonsils are present, not enlarged, midly erythematous with no concretions or exudates present  No cervical lymphadenopathy palpated    Heart has regular rate and rhythm. No murmurs, rubs or gallops are auscultated at this exam.    Respiratory rate rhythm and effort are normal. Breath sounds bilaterally are clear on auscultation without crackles, rhonchi, wheezes or friction rub.    Abdomen: Normal bowel sounds on auscultation. Soft, nontender without rebound or rigidity on palpation  Procedures    Assessment/Plan   Diagnoses and all orders for this visit:  Acute bronchitis due to Rhinovirus  -     benzonatate (Tessalon) 200 mg capsule; Take 1 capsule (200 mg) by mouth 3 times a day as needed for cough for up to 7 days. Do not crush or chew.  Cough, unspecified type  -     POCT SPOTFIRE R/ST Panel Mini w/COVID ([facility]) manually resulted  -     benzonatate (Tessalon) 200 mg capsule; Take 1 capsule (200 mg) by mouth 3 times a day as needed for cough for up to 7 days. Do not crush or chew.  Viral URI  -     benzonatate (Tessalon) 200 mg capsule; Take 1 capsule (200 mg) by mouth 3 times a day as needed for cough for up to 7 days. Do not crush or chew.    Patient disposition: Home

## 2025-05-02 ENCOUNTER — APPOINTMENT (OUTPATIENT)
Dept: PRIMARY CARE | Facility: CLINIC | Age: 25
End: 2025-05-02
Payer: COMMERCIAL